# Patient Record
Sex: FEMALE | Race: WHITE
[De-identification: names, ages, dates, MRNs, and addresses within clinical notes are randomized per-mention and may not be internally consistent; named-entity substitution may affect disease eponyms.]

---

## 2019-11-21 ENCOUNTER — HOSPITAL ENCOUNTER (OUTPATIENT)
Dept: HOSPITAL 56 - MW.ED | Age: 79
Setting detail: OBSERVATION
LOS: 1 days | Discharge: HOME | End: 2019-11-22
Attending: INTERNAL MEDICINE | Admitting: INTERNAL MEDICINE
Payer: MEDICARE

## 2019-11-21 DIAGNOSIS — E03.9: ICD-10-CM

## 2019-11-21 DIAGNOSIS — Z88.2: ICD-10-CM

## 2019-11-21 DIAGNOSIS — I50.9: ICD-10-CM

## 2019-11-21 DIAGNOSIS — I48.91: ICD-10-CM

## 2019-11-21 DIAGNOSIS — Z79.01: ICD-10-CM

## 2019-11-21 DIAGNOSIS — I11.0: ICD-10-CM

## 2019-11-21 DIAGNOSIS — Z88.1: ICD-10-CM

## 2019-11-21 DIAGNOSIS — J44.1: Primary | ICD-10-CM

## 2019-11-21 DIAGNOSIS — N39.0: ICD-10-CM

## 2019-11-21 LAB
BUN SERPL-MCNC: 12 MG/DL (ref 7–18)
CHLORIDE SERPL-SCNC: 111 MMOL/L (ref 98–107)
CO2 SERPL-SCNC: 26.1 MMOL/L (ref 21–32)
GLUCOSE SERPL-MCNC: 119 MG/DL (ref 74–106)
POTASSIUM SERPL-SCNC: 4.2 MMOL/L (ref 3.5–5.1)
SODIUM SERPL-SCNC: 146 MMOL/L (ref 136–145)

## 2019-11-21 PROCEDURE — 99285 EMERGENCY DEPT VISIT HI MDM: CPT

## 2019-11-21 PROCEDURE — 85025 COMPLETE CBC W/AUTO DIFF WBC: CPT

## 2019-11-21 PROCEDURE — 93005 ELECTROCARDIOGRAM TRACING: CPT

## 2019-11-21 PROCEDURE — 71045 X-RAY EXAM CHEST 1 VIEW: CPT

## 2019-11-21 PROCEDURE — 85610 PROTHROMBIN TIME: CPT

## 2019-11-21 PROCEDURE — G0378 HOSPITAL OBSERVATION PER HR: HCPCS

## 2019-11-21 PROCEDURE — 87804 INFLUENZA ASSAY W/OPTIC: CPT

## 2019-11-21 PROCEDURE — 82962 GLUCOSE BLOOD TEST: CPT

## 2019-11-21 PROCEDURE — 80048 BASIC METABOLIC PNL TOTAL CA: CPT

## 2019-11-21 PROCEDURE — 94640 AIRWAY INHALATION TREATMENT: CPT

## 2019-11-21 PROCEDURE — 81001 URINALYSIS AUTO W/SCOPE: CPT

## 2019-11-21 PROCEDURE — 80053 COMPREHEN METABOLIC PANEL: CPT

## 2019-11-21 PROCEDURE — 84484 ASSAY OF TROPONIN QUANT: CPT

## 2019-11-21 PROCEDURE — 36415 COLL VENOUS BLD VENIPUNCTURE: CPT

## 2019-11-21 NOTE — PCM.HP.2
H&P History of Present Illness





- General


Date of Service: 11/21/19


Admit Problem/Dx: 


 Admission Diagnosis/Problem





Admission Diagnosis/Problem      COPD, Mild chronic obstructive pulmonary 

disease











- History of Present Illness


Initial Comments - Free Text/Narative: 





78 yo female with pmh of COPD, KAVITHA, atrial fibrillation, s/p ablation on 

Eliquis who presents with one day history of exertional dyspnea, cough, and 

wheezing.  Patient denies any fevers, chest pain, or chills.


  ** legs


Pain Score (Numeric/FACES): 10





- Related Data


Allergies/Adverse Reactions: 


 Allergies











Allergy/AdvReac Type Severity Reaction Status Date / Time


 


cephalexin [From Keflex] Allergy  Hives Verified 11/21/19 20:25


 


Sulfa (Sulfonamide Allergy  Hives Verified 11/21/19 20:25





Antibiotics)     











Home Medications: 


 Home Meds





Acyclovir 400 mg PO DAILY 11/21/19 [History]


Allopurinol [Zyloprim] 100 mg PO DAILY 11/21/19 [History]


Apixaban [Eliquis] 5 mg PO BID 11/21/19 [History]


Cholecalciferol (Vitamin D3) [Vitamin D] 50,000 unit PO WEEKLY 11/21/19 [History

]


Furosemide [Lasix] 80 mg PO BID 11/21/19 [History]


Gabapentin [Neurontin] 100 mg PO DAILY 11/21/19 [History]


Gabapentin [Neurontin] 300 mg PO BEDTIME 11/21/19 [History]


Levothyroxine 125 mcg PO ACBREAKFAST 11/21/19 [History]


Liraglutide [Victoza] 1.8 mg SUBCUT DAILY 11/21/19 [History]


Meloxicam [Mobic] 7.5 mg PO BID 11/21/19 [History]


Montelukast [Singulair] 10 mg PO DAILY 11/21/19 [History]


Nystatin [Nystop] 60 gm TP BID 11/21/19 [History]


Potassium Chloride 32 meq PO BID 11/21/19 [History]


Simvastatin 20 mg PO BEDTIME 11/21/19 [History]


amLODIPine [Norvasc] 5 mg PO DAILY 11/21/19 [History]


metFORMIN [Glucophage XR] 500 mg PO BIDMEALS 11/21/19 [History]


Albuterol [Ventolin HFA] 1 puff INH Q4H PRN #1 puff 11/22/19 [Rx]


Fluticasone/Salmeterol [Advair 250-50 Diskus] 1 each IH BID #1 disk.w.dev 11/22/ 19 [Rx]


Levofloxacin [Levaquin] 500 mg PO DAILY #5 tablet 11/22/19 [Rx]


predniSONE [Prednisone] 50 mg PO DAILY #5 tablet 11/22/19 [Rx]











Past Medical History


Cardiovascular History: Reports: Heart Failure, Hypertension


Respiratory History: Reports: COPD


Musculoskeletal History: Reports: Back Pain, Chronic


Endocrine/Metabolic History: Reports: Hypothyroidism





- Infectious Disease History


Infectious Disease History: Reports: Chicken Pox





Social & Family History





- Family History


Family Medical History: Noncontributory





- Tobacco Use


Smoking Status *Q: Never Smoker





H&P Review of Systems





- Review of Systems:


Review Of Systems: Comprehensive ROS is negative, except as noted in HPI.





Exam





- Exam


Exam: See Below





- Vital Signs


Vital Signs: 


 Last Vital Signs











Temp  36.4 C   11/21/19 20:00


 


Pulse  85   11/21/19 20:00


 


Resp  19   11/21/19 20:00


 


BP  129/59 L  11/21/19 20:00


 


Pulse Ox  97   11/21/19 20:00











Weight: 106.141 kg





- Exam


General: Alert, Oriented


Lungs: Clear to Auscultation, Normal Respiratory Effort


Cardiovascular: Regular Rate, Regular Rhythm


GI/Abdominal Exam: Normal Bowel Sounds, Soft, Non-Tender


Extremities: Non-Tender, No Pedal Edema


Skin: Warm, Dry, Intact





- Patient Data


Lab Results Last 24 hrs: 


 Laboratory Results - last 24 hr











  11/21/19 11/21/19 11/21/19 Range/Units





  16:49 16:49 16:49 


 


WBC  10.19    (4.0-11.0)  K/uL


 


RBC  4.57    (4.30-5.90)  M/uL


 


Hgb  12.7    (12.0-16.0)  g/dL


 


Hct  41.4    (36.0-46.0)  %


 


MCV  90.6    (80.0-98.0)  fL


 


MCH  27.8    (27.0-32.0)  pg


 


MCHC  30.7 L    (31.0-37.0)  g/dL


 


RDW Std Deviation  49.0    (28.0-62.0)  fl


 


RDW Coeff of Joseph  15    (11.0-15.0)  %


 


Plt Count  331    (150-400)  K/uL


 


MPV  9.80    (7.40-12.00)  fL


 


Neut % (Auto)  56.5    (48.0-80.0)  %


 


Lymph % (Auto)  32.2    (16.0-40.0)  %


 


Mono % (Auto)  8.8    (0.0-15.0)  %


 


Eos % (Auto)  2.2    (0.0-7.0)  %


 


Baso % (Auto)  0.3    (0.0-1.5)  %


 


Neut # (Auto)  5.8 H    (1.4-5.7)  K/uL


 


Lymph # (Auto)  3.3 H    (0.6-2.4)  K/uL


 


Mono # (Auto)  0.9 H    (0.0-0.8)  K/uL


 


Eos # (Auto)  0.2    (0.0-0.7)  K/uL


 


Baso # (Auto)  0.0    (0.0-0.1)  K/uL


 


Nucleated RBC %  0.0    /100WBC


 


Nucleated RBCs #  0    K/uL


 


INR   0.97   


 


Sodium    146 H  (136-145)  mmol/L


 


Potassium    4.2  (3.5-5.1)  mmol/L


 


Chloride    111 H  ()  mmol/L


 


Carbon Dioxide    26.1  (21.0-32.0)  mmol/L


 


BUN    12  (7.0-18.0)  mg/dL


 


Creatinine    0.9  (0.6-1.0)  mg/dL


 


Est Cr Clr Drug Dosing    50.90  mL/min


 


Estimated GFR (MDRD)    > 60.0  ml/min


 


Glucose    119 H  ()  mg/dL


 


POC Glucose     ()  mg/dL


 


Calcium    8.9  (8.5-10.1)  mg/dL


 


Total Bilirubin    0.3  (0.2-1.0)  mg/dL


 


AST    33  (15-37)  IU/L


 


ALT    32  (14-63)  IU/L


 


Alkaline Phosphatase    76  ()  U/L


 


Troponin I    < 0.050  (0.000-0.056)  ng/mL


 


Total Protein    6.4  (6.4-8.2)  g/dL


 


Albumin    3.0 L  (3.4-5.0)  g/dL


 


Globulin    3.4  (2.6-4.0)  g/dL


 


Albumin/Globulin Ratio    0.9  (0.9-1.6)  


 


Urine Color     


 


Urine Appearance     


 


Urine pH     (5.0-8.0)  


 


Ur Specific Gravity     (1.001-1.035)  


 


Urine Protein     (NEGATIVE)  mg/dL


 


Urine Glucose (UA)     (NEGATIVE)  mg/dL


 


Urine Ketones     (NEGATIVE)  mg/dL


 


Urine Occult Blood     (NEGATIVE)  


 


Urine Nitrite     (NEGATIVE)  


 


Urine Bilirubin     (NEGATIVE)  


 


Urine Urobilinogen     (<2.0)  EU/dL


 


Ur Leukocyte Esterase     (NEGATIVE)  


 


Urine RBC     (0-2/HPF)  


 


Urine WBC     (0-5/HPF)  


 


Ur Epithelial Cells     (NONE-FEW)  


 


Urine Bacteria     (NEGATIVE)  


 


Urine Mucus     (NONE-MOD)  














  11/21/19 11/21/19 Range/Units





  18:40 21:33 


 


WBC    (4.0-11.0)  K/uL


 


RBC    (4.30-5.90)  M/uL


 


Hgb    (12.0-16.0)  g/dL


 


Hct    (36.0-46.0)  %


 


MCV    (80.0-98.0)  fL


 


MCH    (27.0-32.0)  pg


 


MCHC    (31.0-37.0)  g/dL


 


RDW Std Deviation    (28.0-62.0)  fl


 


RDW Coeff of Joseph    (11.0-15.0)  %


 


Plt Count    (150-400)  K/uL


 


MPV    (7.40-12.00)  fL


 


Neut % (Auto)    (48.0-80.0)  %


 


Lymph % (Auto)    (16.0-40.0)  %


 


Mono % (Auto)    (0.0-15.0)  %


 


Eos % (Auto)    (0.0-7.0)  %


 


Baso % (Auto)    (0.0-1.5)  %


 


Neut # (Auto)    (1.4-5.7)  K/uL


 


Lymph # (Auto)    (0.6-2.4)  K/uL


 


Mono # (Auto)    (0.0-0.8)  K/uL


 


Eos # (Auto)    (0.0-0.7)  K/uL


 


Baso # (Auto)    (0.0-0.1)  K/uL


 


Nucleated RBC %    /100WBC


 


Nucleated RBCs #    K/uL


 


INR    


 


Sodium    (136-145)  mmol/L


 


Potassium    (3.5-5.1)  mmol/L


 


Chloride    ()  mmol/L


 


Carbon Dioxide    (21.0-32.0)  mmol/L


 


BUN    (7.0-18.0)  mg/dL


 


Creatinine    (0.6-1.0)  mg/dL


 


Est Cr Clr Drug Dosing    mL/min


 


Estimated GFR (MDRD)    ml/min


 


Glucose    ()  mg/dL


 


POC Glucose   195 H  ()  mg/dL


 


Calcium    (8.5-10.1)  mg/dL


 


Total Bilirubin    (0.2-1.0)  mg/dL


 


AST    (15-37)  IU/L


 


ALT    (14-63)  IU/L


 


Alkaline Phosphatase    ()  U/L


 


Troponin I    (0.000-0.056)  ng/mL


 


Total Protein    (6.4-8.2)  g/dL


 


Albumin    (3.4-5.0)  g/dL


 


Globulin    (2.6-4.0)  g/dL


 


Albumin/Globulin Ratio    (0.9-1.6)  


 


Urine Color  YELLOW   


 


Urine Appearance  CLOUDY   


 


Urine pH  6.0   (5.0-8.0)  


 


Ur Specific Gravity  1.015   (1.001-1.035)  


 


Urine Protein  NEGATIVE   (NEGATIVE)  mg/dL


 


Urine Glucose (UA)  NEGATIVE   (NEGATIVE)  mg/dL


 


Urine Ketones  NEGATIVE   (NEGATIVE)  mg/dL


 


Urine Occult Blood  TRACE-INTACT H   (NEGATIVE)  


 


Urine Nitrite  POSITIVE H   (NEGATIVE)  


 


Urine Bilirubin  NEGATIVE   (NEGATIVE)  


 


Urine Urobilinogen  0.2   (<2.0)  EU/dL


 


Ur Leukocyte Esterase  SMALL H   (NEGATIVE)  


 


Urine RBC  1-3   (0-2/HPF)  


 


Urine WBC  30-35   (0-5/HPF)  


 


Ur Epithelial Cells  FEW   (NONE-FEW)  


 


Urine Bacteria  4+ H   (NEGATIVE)  


 


Urine Mucus  LIGHT   (NONE-MOD)  











Result Diagrams: 


 11/22/19 06:03





 11/22/19 06:03


Mirza Results Last 24 hrs: 


 Microbiology











 11/21/19 17:52 Influenza Type A Antigen Screen - Final





 Nasopharyngeal Swab    NEGATIVE INFLUENZA A VIRUS AG





    REFERENCE RANGE: NEGATIVE





 Influenza Type B Antigen Screen - Final





    NEGATIVE INFLUENZA B VIRUS AG





    REFERENCE RANGE: NEGATIVE











Problem List Initiated/Reviewed/Updated: Yes


Orders Last 24hrs: 


 Active Orders 24 hr











 Category Date Time Status


 


 Patient Status [ADT] Stat ADT  11/21/19 18:42 Active


 


 Antiembolic Devices [RC] PER UNIT ROUTINE Care  11/21/19 22:26 Ordered


 


 Blood Glucose Check, Bedside [RC] TIDAC Care  11/21/19 21:27 Active


 


 Cardiac Monitoring [RC] .AS DIRECTED Care  11/21/19 16:31 Active


 


 EKG Documentation Completion [RC] STAT Care  11/21/19 16:31 Active


 


 Oxygen Therapy [RC] ASDIRECTED Care  11/21/19 16:31 Active


 


 Oxygen Therapy [RC] PRN Care  11/21/19 22:25 Ordered


 


 Pulse Oximetry [RC] ASDIRECTED Care  11/21/19 16:31 Active


 


 RT Aerosol Therapy [RC] ASDIRECTED Care  11/21/19 21:29 Active


 


 RT Aerosol Therapy [RC] ASDIRECTED Care  11/21/19 22:26 Ordered


 


 Up ad Smita [RC] ASDIRECTED Care  11/21/19 22:25 Ordered


 


 VTE/DVT Education [RC] PER UNIT ROUTINE Care  11/21/19 22:25 Ordered


 


 Vital Signs [RC] Q4H Care  11/21/19 22:25 Ordered


 


 ADA Diabetic [American Diabetic Association Diet] [DIET Diet  11/22/19 

Breakfast Active





 ]   


 


 BASIC METABOLIC PANEL,BMP [CHEM] AM Lab  11/22/19 05:11 Ordered


 


 CBC WITH AUTO DIFF [HEME] AM Lab  11/22/19 05:11 Ordered


 


 Albuterol/Ipratropium [DuoNeb 3.0-0.5 MG/3 ML] Med  11/21/19 22:25 Ordered





 3 ml NEB Q2H PRN   


 


 Albuterol/Ipratropium [DuoNeb 3.0-0.5 MG/3 ML] Med  11/22/19 00:00 Active





 3 ml NEB Q6HRRT   


 


 Apixaban [Eliquis] Med  11/22/19 09:00 Ordered





 5 mg PO BID   


 


 Furosemide [Lasix] Med  11/22/19 09:00 Ordered





 80 mg PO BID   


 


 Gabapentin [Neurontin] Med  11/22/19 09:00 Ordered





 100 mg PO DAILY   


 


 Gabapentin [Neurontin] Med  11/21/19 21:00 Ordered





 300 mg PO BEDTIME   


 


 Insulin Aspart [NovoLOG] Med  11/21/19 22:00 Active





 See Protocol  SUBCUT QID   


 


 Liraglutide Med  11/22/19 09:00 Ordered





 1.8 mg SUBCUT DAILY   


 


 Montelukast [Singulair] Med  11/22/19 09:00 Ordered





 10 mg PO DAILY   


 


 Pantoprazole [ProTONIX***] Med  11/22/19 09:00 Ordered





 40 mg PO DAILY   


 


 Simvastatin [Zocor] Med  11/22/19 21:00 Ordered





 20 mg PO BEDTIME   


 


 amLODIPine [Norvasc] Med  11/22/19 09:00 Ordered





 5 mg PO DAILY   


 


 methylPREDNISolone Sod Succ [Solu-MEDROL] Med  11/22/19 09:00 Ordered





 125 mg IVPUSH DAILY   


 


 Sequential Compression Device [OM.PC] Per Unit Routine Oth  11/21/19 22:25 

Ordered


 


 Resuscitation Status Routine Resus Stat  11/21/19 22:25 Ordered








 Medication Orders





Albuterol/Ipratropium (Duoneb 3.0-0.5 Mg/3 Ml)  3 ml NEB Q6HRRT [facility]KA


Albuterol/Ipratropium (Duoneb 3.0-0.5 Mg/3 Ml)  3 ml NEB Q2H PRN


   PRN Reason: Shortness Of Breath/wheezing


Amlodipine Besylate (Norvasc)  5 mg PO DAILY ZULEYKA


Apixaban (Eliquis)  5 mg PO BID [facility]KA


Furosemide (Lasix)  80 mg PO BID [facility]ULEYKA


Gabapentin (Neurontin)  100 mg PO DAILY [facility]


Gabapentin (Neurontin)  300 mg PO BEDTIME [facility]


Insulin Aspart (Novolog)  0 unit SUBCUT QID ZULEYKA; Protocol


   Last Admin: 11/21/19 22:04  Dose: 1 unit


Methylprednisolone Sodium Succinate (Solu-Medrol)  125 mg IVPUSH DAILY [facility]


Montelukast Sodium (Singulair)  10 mg PO DAILY [facility]


Non-Formulary Medication (Liraglutide)  1.8 mg SUBCUT DAILY [facility]


Pantoprazole Sodium (Protonix***)  40 mg PO DAILY [facility]


Simvastatin (Zocor)  20 mg PO BEDTIME [facility]








Assessment/Plan Comment:: 





78 yo female admitted for COPD exacerbation.

## 2019-11-21 NOTE — CR
Indication:



SOB



Technique:



A single AP view of the chest was obtained.



Comparison:



None



Findings:



The heart is normal in size. The lungs are clear. No infiltrate, pleural 

effusion, or pneumothorax is identified.



Impression:



No acute cardiopulmonary process.



Dictated by Kerrie Perez MD @ Nov 21 2019  6:04PM



Signed by Dr. Kerrie Perez @ Nov 21 2019  6:06PM

## 2019-11-21 NOTE — EDM.PDOC
ED HPI GENERAL MEDICAL PROBLEM





- General


Chief Complaint: Respiratory Problem


Stated Complaint: TROUBLE BREATHING


Time Seen by Provider: 11/21/19 16:22





- History of Present Illness


INITIAL COMMENTS - FREE TEXT/NARRATIVE: 


HISTORY AND PHYSICAL:





History of present illness:


Patient is 79-year-old white female history COPD who presents with a concern of 

shortness of breath patient recently moved to the area and brought her 

medications but does not have a nebulizer she denies chest pain nausea vomiting 

fever chills or other complaints.





Review of systems: 


As per history of present illness and below otherwise all systems reviewed and 

negative.





Past medical history: 


As per history of present illness and as reviewed below otherwise 

noncontributory.





Surgical history: 


As per history of present illness and as reviewed below otherwise 

noncontributory.





Social history: 


No reported history of drug or alcohol abuse.





Family history: 


As per history of present illness and as reviewed below otherwise 

noncontributory.





Physical exam:


HEENT: Atraumatic, normocephalic, pupils reactive, negative for conjunctival 

pallor or scleral icterus, mucous membranes moist, throat clear, neck supple, 

nontender, trachea midline.


Lungs: Diminished slightly coarse bilaterally, breath sounds equal bilaterally, 

chest nontender.


Heart: S1S2, regular, negative for clicks, rubs, or JVD.


Abdomen: Soft, nondistended, nontender. Negative for masses or 

hepatosplenomegaly. Negative for costovertebral tenderness.


Pelvis: Stable nontender.


Genitourinary: Deferred.


Rectal: Deferred.


Extremities: Atraumatic, negative for cords or calf pain. Neurovascular 

unremarkable.


Neuro: Awake, alert, oriented. Cranial nerves II through XII unremarkable. 

Cerebellum unremarkable. Motor and sensory unremarkable throughout. Exam 

nonfocal.





Diagnostics:


CBC CMP troponin PT/INR chest x-ray EKG





Therapeutics:


Albuterol ipratropium nebulizer IV O2 monitor I Medrol 125 mg IV





Impression: 


#1 COPD with exacerbation #2 medical noncompliance





Definitive disposition and diagnosis as appropriate pending reevaluation and 

review of above.








- Related Data


 Allergies











Allergy/AdvReac Type Severity Reaction Status Date / Time


 


cephalexin [From Keflex] Allergy  Hives Verified 11/21/19 16:30


 


Sulfa (Sulfonamide Allergy  Hives Verified 11/21/19 16:30





Antibiotics)     














ED ROS GENERAL





- Review of Systems


Review Of Systems: Comprehensive ROS is negative, except as noted in HPI.





ED EXAM, GENERAL





- Physical Exam


Exam: See Below (dictation)





Course





- Vital Signs


Last Recorded V/S: 


 Last Vital Signs











Temp  36.5 C   11/21/19 16:26


 


Pulse  79   11/21/19 18:30


 


Resp  21 H  11/21/19 18:30


 


BP  128/58 L  11/21/19 18:30


 


Pulse Ox  99   11/21/19 18:30














- Orders/Labs/Meds


Orders: 


 Active Orders 24 hr











 Category Date Time Status


 


 Cardiac Monitoring [RC] .AS DIRECTED Care  11/21/19 16:31 Active


 


 EKG Documentation Completion [RC] STAT Care  11/21/19 16:31 Active


 


 Oxygen Therapy [RC] ASDIRECTED Care  11/21/19 16:31 Active


 


 Pulse Oximetry [RC] ASDIRECTED Care  11/21/19 16:31 Active


 


 UA W/MICROSCOPIC [URIN] Stat Lab  11/21/19 16:31 Ordered











Labs: 


 Laboratory Tests











  11/21/19 11/21/19 11/21/19 Range/Units





  16:49 16:49 16:49 


 


WBC  10.19    (4.0-11.0)  K/uL


 


RBC  4.57    (4.30-5.90)  M/uL


 


Hgb  12.7    (12.0-16.0)  g/dL


 


Hct  41.4    (36.0-46.0)  %


 


MCV  90.6    (80.0-98.0)  fL


 


MCH  27.8    (27.0-32.0)  pg


 


MCHC  30.7 L    (31.0-37.0)  g/dL


 


RDW Std Deviation  49.0    (28.0-62.0)  fl


 


RDW Coeff of Joseph  15    (11.0-15.0)  %


 


Plt Count  331    (150-400)  K/uL


 


MPV  9.80    (7.40-12.00)  fL


 


Neut % (Auto)  56.5    (48.0-80.0)  %


 


Lymph % (Auto)  32.2    (16.0-40.0)  %


 


Mono % (Auto)  8.8    (0.0-15.0)  %


 


Eos % (Auto)  2.2    (0.0-7.0)  %


 


Baso % (Auto)  0.3    (0.0-1.5)  %


 


Neut # (Auto)  5.8 H    (1.4-5.7)  K/uL


 


Lymph # (Auto)  3.3 H    (0.6-2.4)  K/uL


 


Mono # (Auto)  0.9 H    (0.0-0.8)  K/uL


 


Eos # (Auto)  0.2    (0.0-0.7)  K/uL


 


Baso # (Auto)  0.0    (0.0-0.1)  K/uL


 


Nucleated RBC %  0.0    /100WBC


 


Nucleated RBCs #  0    K/uL


 


INR   0.97   


 


Sodium    146 H  (136-145)  mmol/L


 


Potassium    4.2  (3.5-5.1)  mmol/L


 


Chloride    111 H  ()  mmol/L


 


Carbon Dioxide    26.1  (21.0-32.0)  mmol/L


 


BUN    12  (7.0-18.0)  mg/dL


 


Creatinine    0.9  (0.6-1.0)  mg/dL


 


Est Cr Clr Drug Dosing    50.90  mL/min


 


Estimated GFR (MDRD)    > 60.0  ml/min


 


Glucose    119 H  ()  mg/dL


 


Calcium    8.9  (8.5-10.1)  mg/dL


 


Total Bilirubin    0.3  (0.2-1.0)  mg/dL


 


AST    33  (15-37)  IU/L


 


ALT    32  (14-63)  IU/L


 


Alkaline Phosphatase    76  ()  U/L


 


Troponin I    < 0.050  (0.000-0.056)  ng/mL


 


Total Protein    6.4  (6.4-8.2)  g/dL


 


Albumin    3.0 L  (3.4-5.0)  g/dL


 


Globulin    3.4  (2.6-4.0)  g/dL


 


Albumin/Globulin Ratio    0.9  (0.9-1.6)  











Meds: 


Medications














Discontinued Medications














Generic Name Dose Route Start Last Admin





  Trade Name Freq  PRN Reason Stop Dose Admin


 


Albuterol/Ipratropium  Confirm  11/21/19 16:25  11/21/19 17:07





  Duoneb 3.0-0.5 Mg/3 Ml  Administered  11/21/19 16:26  3 ml





  Dose   Administration





  3 ml   





  .ROUTE   





  .STK-MED ONE   





     





     





     





     


 


Aspirin  324 mg  11/21/19 16:31  11/21/19 17:07





  Aspirin  PO  11/21/19 16:32  324 mg





  ONETIME ONE   Administration





     





     





     





     


 


Methylprednisolone Sodium Succinate  125 mg  11/21/19 16:32  11/21/19 17:09





  Solu-Medrol  IVPUSH  11/21/19 16:33  125 mg





  ONETIME ONE   Administration





     





     





     





     














Departure





- Departure


Time of Disposition: 18:40


Disposition: Home, Self-Care 01


Condition: Good


Clinical Impression: 


 COPD (chronic obstructive pulmonary disease)








- Discharge Information


Referrals: 


Birgit Holden DO [Primary Care Provider] - 


Forms:  ED Department Discharge





- My Orders


Last 24 Hours: 


My Active Orders





11/21/19 16:31


Cardiac Monitoring [RC] .AS DIRECTED 


EKG Documentation Completion [RC] STAT 


Oxygen Therapy [RC] ASDIRECTED 


Pulse Oximetry [RC] ASDIRECTED 


UA W/MICROSCOPIC [URIN] Stat 














- Assessment/Plan


Last 24 Hours: 


My Active Orders





11/21/19 16:31


Cardiac Monitoring [RC] .AS DIRECTED 


EKG Documentation Completion [RC] STAT 


Oxygen Therapy [RC] ASDIRECTED 


Pulse Oximetry [RC] ASDIRECTED 


UA W/MICROSCOPIC [URIN] Stat

## 2019-11-22 LAB
BUN SERPL-MCNC: 13 MG/DL (ref 7–18)
CHLORIDE SERPL-SCNC: 108 MMOL/L (ref 98–107)
CO2 SERPL-SCNC: 24.1 MMOL/L (ref 21–32)
GLUCOSE SERPL-MCNC: 170 MG/DL (ref 74–106)
POTASSIUM SERPL-SCNC: 4.5 MMOL/L (ref 3.5–5.1)
SODIUM SERPL-SCNC: 143 MMOL/L (ref 136–145)

## 2019-11-22 NOTE — [id number].[id number]
**Discharge Summary





- Discharge Data


Discharge Date: 11/22/19


Discharge Disposition: Home, Self-Care 01


Condition: Good





- Referral to Home Health


Primary Care Physician: 


Birgit Holden DO








- Patient Summary/Data


Hospital Course: 


80 yo female with pmh of COPD, KAVITHA, atrial fibrillation, s/p ablation on 

Eliquis who presented with one day history of exertional dyspnea, cough, and 

wheezing.    Patient was satting mid 90s on room air.  She had wheezing on 

presentation but had no acute respiratory distress.  Chest x-ray reported no 

acute pulmonary disease.  She was admitted for COPD exacerbation and treated 

with solumedrol and duonebs.  The following morning patient was requesting 

discharge home.  She was discharge home with five more days of prednisone, 

albuterol inhaler and Advair.  Patient was also sent home with Levaquin for 

treatment of a UTI.








- Discharge Plan


Prescriptions/Med Rec: 


Albuterol [Ventolin HFA] 1 puff INH Q4H PRN #1 puff


 PRN Reason: Wheezing


Fluticasone/Salmeterol [Advair 250-50 Diskus] 1 each IH BID #1 disk.w.dev


Levofloxacin [Levaquin] 500 mg PO DAILY #5 tablet


predniSONE [Prednisone] 50 mg PO DAILY #5 tablet


Home Medications: 


 Home Meds





Acyclovir 400 mg PO DAILY 11/21/19 [History]


Allopurinol [Zyloprim] 100 mg PO DAILY 11/21/19 [History]


Apixaban [Eliquis] 5 mg PO BID 11/21/19 [History]


Cholecalciferol (Vitamin D3) [Vitamin D] 50,000 unit PO WEEKLY 11/21/19 [History

]


Furosemide [Lasix] 80 mg PO BID 11/21/19 [History]


Gabapentin [Neurontin] 100 mg PO DAILY 11/21/19 [History]


Gabapentin [Neurontin] 300 mg PO BEDTIME 11/21/19 [History]


Levothyroxine 125 mcg PO ACBREAKFAST 11/21/19 [History]


Liraglutide [Victoza] 1.8 mg SUBCUT DAILY 11/21/19 [History]


Meloxicam [Mobic] 7.5 mg PO BID 11/21/19 [History]


Montelukast [Singulair] 10 mg PO DAILY 11/21/19 [History]


Nystatin [Nystop] 60 gm TP BID 11/21/19 [History]


Potassium Chloride 32 meq PO BID 11/21/19 [History]


Simvastatin 20 mg PO BEDTIME 11/21/19 [History]


amLODIPine [Norvasc] 5 mg PO DAILY 11/21/19 [History]


metFORMIN [Glucophage XR] 500 mg PO BIDMEALS 11/21/19 [History]


Albuterol [Ventolin HFA] 1 puff INH Q4H PRN #1 puff 11/22/19 [Rx]


Fluticasone/Salmeterol [Advair 250-50 Diskus] 1 each IH BID #1 disk.w.dev 11/22/ 19 [Rx]


Levofloxacin [Levaquin] 500 mg PO DAILY #5 tablet 11/22/19 [Rx]


predniSONE [Prednisone] 50 mg PO DAILY #5 tablet 11/22/19 [Rx]








Forms:  ED Department Discharge


Referrals: 


Birgit Holden DO [Primary Care Provider] - 





- Discharge Summary/Plan Comment


DC Time >30 min.: No





- Patient Data


Vitals - Most Recent: 


 Last Vital Signs











Temp  36.0 C   11/22/19 04:00


 


Pulse  81   11/22/19 04:00


 


Resp  16   11/22/19 04:00


 


BP  133/63   11/22/19 09:37


 


Pulse Ox  96   11/22/19 06:01











Weight - Most Recent: 106.141 kg


I&O - Last 24 hours: 


 Intake & Output











 11/21/19 11/22/19 11/22/19





 22:59 06:59 14:59


 


Intake Total  150 


 


Output Total  500 


 


Balance  -350 











Lab Results - Last 24 hrs: 


 Laboratory Results - last 24 hr











  11/21/19 11/21/19 11/21/19 Range/Units





  16:49 16:49 16:49 


 


WBC  10.19    (4.0-11.0)  K/uL


 


RBC  4.57    (4.30-5.90)  M/uL


 


Hgb  12.7    (12.0-16.0)  g/dL


 


Hct  41.4    (36.0-46.0)  %


 


MCV  90.6    (80.0-98.0)  fL


 


MCH  27.8    (27.0-32.0)  pg


 


MCHC  30.7 L    (31.0-37.0)  g/dL


 


RDW Std Deviation  49.0    (28.0-62.0)  fl


 


RDW Coeff of Joseph  15    (11.0-15.0)  %


 


Plt Count  331    (150-400)  K/uL


 


MPV  9.80    (7.40-12.00)  fL


 


Neut % (Auto)  56.5    (48.0-80.0)  %


 


Lymph % (Auto)  32.2    (16.0-40.0)  %


 


Mono % (Auto)  8.8    (0.0-15.0)  %


 


Eos % (Auto)  2.2    (0.0-7.0)  %


 


Baso % (Auto)  0.3    (0.0-1.5)  %


 


Neut # (Auto)  5.8 H    (1.4-5.7)  K/uL


 


Lymph # (Auto)  3.3 H    (0.6-2.4)  K/uL


 


Mono # (Auto)  0.9 H    (0.0-0.8)  K/uL


 


Eos # (Auto)  0.2    (0.0-0.7)  K/uL


 


Baso # (Auto)  0.0    (0.0-0.1)  K/uL


 


Nucleated RBC %  0.0    /100WBC


 


Nucleated RBCs #  0    K/uL


 


INR   0.97   


 


Sodium    146 H  (136-145)  mmol/L


 


Potassium    4.2  (3.5-5.1)  mmol/L


 


Chloride    111 H  ()  mmol/L


 


Carbon Dioxide    26.1  (21.0-32.0)  mmol/L


 


BUN    12  (7.0-18.0)  mg/dL


 


Creatinine    0.9  (0.6-1.0)  mg/dL


 


Est Cr Clr Drug Dosing    50.90  mL/min


 


Estimated GFR (MDRD)    > 60.0  ml/min


 


Glucose    119 H  ()  mg/dL


 


POC Glucose     ()  mg/dL


 


Calcium    8.9  (8.5-10.1)  mg/dL


 


Total Bilirubin    0.3  (0.2-1.0)  mg/dL


 


AST    33  (15-37)  IU/L


 


ALT    32  (14-63)  IU/L


 


Alkaline Phosphatase    76  ()  U/L


 


Troponin I    < 0.050  (0.000-0.056)  ng/mL


 


Total Protein    6.4  (6.4-8.2)  g/dL


 


Albumin    3.0 L  (3.4-5.0)  g/dL


 


Globulin    3.4  (2.6-4.0)  g/dL


 


Albumin/Globulin Ratio    0.9  (0.9-1.6)  


 


Urine Color     


 


Urine Appearance     


 


Urine pH     (5.0-8.0)  


 


Ur Specific Gravity     (1.001-1.035)  


 


Urine Protein     (NEGATIVE)  mg/dL


 


Urine Glucose (UA)     (NEGATIVE)  mg/dL


 


Urine Ketones     (NEGATIVE)  mg/dL


 


Urine Occult Blood     (NEGATIVE)  


 


Urine Nitrite     (NEGATIVE)  


 


Urine Bilirubin     (NEGATIVE)  


 


Urine Urobilinogen     (<2.0)  EU/dL


 


Ur Leukocyte Esterase     (NEGATIVE)  


 


Urine RBC     (0-2/HPF)  


 


Urine WBC     (0-5/HPF)  


 


Ur Epithelial Cells     (NONE-FEW)  


 


Urine Bacteria     (NEGATIVE)  


 


Urine Mucus     (NONE-MOD)  














  11/21/19 11/21/19 11/22/19 Range/Units





  18:40 21:33 00:28 


 


WBC     (4.0-11.0)  K/uL


 


RBC     (4.30-5.90)  M/uL


 


Hgb     (12.0-16.0)  g/dL


 


Hct     (36.0-46.0)  %


 


MCV     (80.0-98.0)  fL


 


MCH     (27.0-32.0)  pg


 


MCHC     (31.0-37.0)  g/dL


 


RDW Std Deviation     (28.0-62.0)  fl


 


RDW Coeff of Joseph     (11.0-15.0)  %


 


Plt Count     (150-400)  K/uL


 


MPV     (7.40-12.00)  fL


 


Neut % (Auto)     (48.0-80.0)  %


 


Lymph % (Auto)     (16.0-40.0)  %


 


Mono % (Auto)     (0.0-15.0)  %


 


Eos % (Auto)     (0.0-7.0)  %


 


Baso % (Auto)     (0.0-1.5)  %


 


Neut # (Auto)     (1.4-5.7)  K/uL


 


Lymph # (Auto)     (0.6-2.4)  K/uL


 


Mono # (Auto)     (0.0-0.8)  K/uL


 


Eos # (Auto)     (0.0-0.7)  K/uL


 


Baso # (Auto)     (0.0-0.1)  K/uL


 


Nucleated RBC %     /100WBC


 


Nucleated RBCs #     K/uL


 


INR     


 


Sodium     (136-145)  mmol/L


 


Potassium     (3.5-5.1)  mmol/L


 


Chloride     ()  mmol/L


 


Carbon Dioxide     (21.0-32.0)  mmol/L


 


BUN     (7.0-18.0)  mg/dL


 


Creatinine     (0.6-1.0)  mg/dL


 


Est Cr Clr Drug Dosing     mL/min


 


Estimated GFR (MDRD)     ml/min


 


Glucose     ()  mg/dL


 


POC Glucose   195 H  183 H  ()  mg/dL


 


Calcium     (8.5-10.1)  mg/dL


 


Total Bilirubin     (0.2-1.0)  mg/dL


 


AST     (15-37)  IU/L


 


ALT     (14-63)  IU/L


 


Alkaline Phosphatase     ()  U/L


 


Troponin I     (0.000-0.056)  ng/mL


 


Total Protein     (6.4-8.2)  g/dL


 


Albumin     (3.4-5.0)  g/dL


 


Globulin     (2.6-4.0)  g/dL


 


Albumin/Globulin Ratio     (0.9-1.6)  


 


Urine Color  YELLOW    


 


Urine Appearance  CLOUDY    


 


Urine pH  6.0    (5.0-8.0)  


 


Ur Specific Gravity  1.015    (1.001-1.035)  


 


Urine Protein  NEGATIVE    (NEGATIVE)  mg/dL


 


Urine Glucose (UA)  NEGATIVE    (NEGATIVE)  mg/dL


 


Urine Ketones  NEGATIVE    (NEGATIVE)  mg/dL


 


Urine Occult Blood  TRACE-INTACT H    (NEGATIVE)  


 


Urine Nitrite  POSITIVE H    (NEGATIVE)  


 


Urine Bilirubin  NEGATIVE    (NEGATIVE)  


 


Urine Urobilinogen  0.2    (<2.0)  EU/dL


 


Ur Leukocyte Esterase  SMALL H    (NEGATIVE)  


 


Urine RBC  1-3    (0-2/HPF)  


 


Urine WBC  30-35    (0-5/HPF)  


 


Ur Epithelial Cells  FEW    (NONE-FEW)  


 


Urine Bacteria  4+ H    (NEGATIVE)  


 


Urine Mucus  LIGHT    (NONE-MOD)  














  11/22/19 11/22/19 11/22/19 Range/Units





  06:03 06:03 06:09 


 


WBC  9.67    (4.0-11.0)  K/uL


 


RBC  4.81    (4.30-5.90)  M/uL


 


Hgb  13.4    (12.0-16.0)  g/dL


 


Hct  42.7    (36.0-46.0)  %


 


MCV  88.8    (80.0-98.0)  fL


 


MCH  27.9    (27.0-32.0)  pg


 


MCHC  31.4    (31.0-37.0)  g/dL


 


RDW Std Deviation  46.7    (28.0-62.0)  fl


 


RDW Coeff of Joseph  14    (11.0-15.0)  %


 


Plt Count  355    (150-400)  K/uL


 


MPV  10.00    (7.40-12.00)  fL


 


Neut % (Auto)  85.4 H    (48.0-80.0)  %


 


Lymph % (Auto)  13.9 L    (16.0-40.0)  %


 


Mono % (Auto)  0.6    (0.0-15.0)  %


 


Eos % (Auto)  0.0    (0.0-7.0)  %


 


Baso % (Auto)  0.1    (0.0-1.5)  %


 


Neut # (Auto)  8.3 H    (1.4-5.7)  K/uL


 


Lymph # (Auto)  1.3    (0.6-2.4)  K/uL


 


Mono # (Auto)  0.1    (0.0-0.8)  K/uL


 


Eos # (Auto)  0.0    (0.0-0.7)  K/uL


 


Baso # (Auto)  0.0    (0.0-0.1)  K/uL


 


Nucleated RBC %  0.0    /100WBC


 


Nucleated RBCs #  0    K/uL


 


INR     


 


Sodium   143   (136-145)  mmol/L


 


Potassium   4.5   (3.5-5.1)  mmol/L


 


Chloride   108 H   ()  mmol/L


 


Carbon Dioxide   24.1   (21.0-32.0)  mmol/L


 


BUN   13   (7.0-18.0)  mg/dL


 


Creatinine   1.0   (0.6-1.0)  mg/dL


 


Est Cr Clr Drug Dosing   45.82   mL/min


 


Estimated GFR (MDRD)   > 60.0   ml/min


 


Glucose   170 H   ()  mg/dL


 


POC Glucose    161 H  ()  mg/dL


 


Calcium   9.2   (8.5-10.1)  mg/dL


 


Total Bilirubin     (0.2-1.0)  mg/dL


 


AST     (15-37)  IU/L


 


ALT     (14-63)  IU/L


 


Alkaline Phosphatase     ()  U/L


 


Troponin I     (0.000-0.056)  ng/mL


 


Total Protein     (6.4-8.2)  g/dL


 


Albumin     (3.4-5.0)  g/dL


 


Globulin     (2.6-4.0)  g/dL


 


Albumin/Globulin Ratio     (0.9-1.6)  


 


Urine Color     


 


Urine Appearance     


 


Urine pH     (5.0-8.0)  


 


Ur Specific Gravity     (1.001-1.035)  


 


Urine Protein     (NEGATIVE)  mg/dL


 


Urine Glucose (UA)     (NEGATIVE)  mg/dL


 


Urine Ketones     (NEGATIVE)  mg/dL


 


Urine Occult Blood     (NEGATIVE)  


 


Urine Nitrite     (NEGATIVE)  


 


Urine Bilirubin     (NEGATIVE)  


 


Urine Urobilinogen     (<2.0)  EU/dL


 


Ur Leukocyte Esterase     (NEGATIVE)  


 


Urine RBC     (0-2/HPF)  


 


Urine WBC     (0-5/HPF)  


 


Ur Epithelial Cells     (NONE-FEW)  


 


Urine Bacteria     (NEGATIVE)  


 


Urine Mucus     (NONE-MOD)  














  11/22/19 Range/Units





  11:56 


 


WBC   (4.0-11.0)  K/uL


 


RBC   (4.30-5.90)  M/uL


 


Hgb   (12.0-16.0)  g/dL


 


Hct   (36.0-46.0)  %


 


MCV   (80.0-98.0)  fL


 


MCH   (27.0-32.0)  pg


 


MCHC   (31.0-37.0)  g/dL


 


RDW Std Deviation   (28.0-62.0)  fl


 


RDW Coeff of Joseph   (11.0-15.0)  %


 


Plt Count   (150-400)  K/uL


 


MPV   (7.40-12.00)  fL


 


Neut % (Auto)   (48.0-80.0)  %


 


Lymph % (Auto)   (16.0-40.0)  %


 


Mono % (Auto)   (0.0-15.0)  %


 


Eos % (Auto)   (0.0-7.0)  %


 


Baso % (Auto)   (0.0-1.5)  %


 


Neut # (Auto)   (1.4-5.7)  K/uL


 


Lymph # (Auto)   (0.6-2.4)  K/uL


 


Mono # (Auto)   (0.0-0.8)  K/uL


 


Eos # (Auto)   (0.0-0.7)  K/uL


 


Baso # (Auto)   (0.0-0.1)  K/uL


 


Nucleated RBC %   /100WBC


 


Nucleated RBCs #   K/uL


 


INR   


 


Sodium   (136-145)  mmol/L


 


Potassium   (3.5-5.1)  mmol/L


 


Chloride   ()  mmol/L


 


Carbon Dioxide   (21.0-32.0)  mmol/L


 


BUN   (7.0-18.0)  mg/dL


 


Creatinine   (0.6-1.0)  mg/dL


 


Est Cr Clr Drug Dosing   mL/min


 


Estimated GFR (MDRD)   ml/min


 


Glucose   ()  mg/dL


 


POC Glucose  153 H  ()  mg/dL


 


Calcium   (8.5-10.1)  mg/dL


 


Total Bilirubin   (0.2-1.0)  mg/dL


 


AST   (15-37)  IU/L


 


ALT   (14-63)  IU/L


 


Alkaline Phosphatase   ()  U/L


 


Troponin I   (0.000-0.056)  ng/mL


 


Total Protein   (6.4-8.2)  g/dL


 


Albumin   (3.4-5.0)  g/dL


 


Globulin   (2.6-4.0)  g/dL


 


Albumin/Globulin Ratio   (0.9-1.6)  


 


Urine Color   


 


Urine Appearance   


 


Urine pH   (5.0-8.0)  


 


Ur Specific Gravity   (1.001-1.035)  


 


Urine Protein   (NEGATIVE)  mg/dL


 


Urine Glucose (UA)   (NEGATIVE)  mg/dL


 


Urine Ketones   (NEGATIVE)  mg/dL


 


Urine Occult Blood   (NEGATIVE)  


 


Urine Nitrite   (NEGATIVE)  


 


Urine Bilirubin   (NEGATIVE)  


 


Urine Urobilinogen   (<2.0)  EU/dL


 


Ur Leukocyte Esterase   (NEGATIVE)  


 


Urine RBC   (0-2/HPF)  


 


Urine WBC   (0-5/HPF)  


 


Ur Epithelial Cells   (NONE-FEW)  


 


Urine Bacteria   (NEGATIVE)  


 


Urine Mucus   (NONE-MOD)  











LUCIA Results - Last 24 hrs: 


 Microbiology











 11/21/19 17:52 Influenza Type A Antigen Screen - Final





 Nasopharyngeal Swab    NEGATIVE INFLUENZA A VIRUS AG





    REFERENCE RANGE: NEGATIVE





 Influenza Type B Antigen Screen - Final





    NEGATIVE INFLUENZA B VIRUS AG





    REFERENCE RANGE: NEGATIVE











Med Orders - Current: 


 Current Medications





Albuterol/Ipratropium (Duoneb 3.0-0.5 Mg/3 Ml)  3 ml NEB Q6HRRT [facility]KA


   Last Admin: 11/22/19 11:18 Dose:  3 ml


Albuterol/Ipratropium (Duoneb 3.0-0.5 Mg/3 Ml)  3 ml NEB Q2H PRN


   PRN Reason: Shortness Of Breath/wheezing


Amlodipine Besylate (Norvasc)  5 mg PO DAILY [facility]


   Last Admin: 11/22/19 09:37 Dose:  5 mg


Apixaban (Eliquis)  5 mg PO BID [facility]


   Last Admin: 11/22/19 09:37 Dose:  5 mg


Furosemide (Lasix)  80 mg PO BIDDIURETIC [facility]


   Last Admin: 11/22/19 08:02 Dose:  80 mg


Gabapentin (Neurontin)  100 mg PO DAILY [facility]


   Last Admin: 11/22/19 09:37 Dose:  100 mg


Gabapentin (Neurontin)  300 mg PO BEDTIME [facility]


   Last Admin: 11/21/19 22:41 Dose:  300 mg


Insulin Aspart (Novolog)  0 unit SUBCUT QIDACANDBED [facility]; Protocol


   Last Admin: 11/22/19 07:35 Dose:  1 unit


Methylprednisolone Sodium Succinate (Solu-Medrol)  125 mg IVPUSH DAILY [facility]


   Last Admin: 11/22/19 09:39 Dose:  125 mg


Montelukast Sodium (Singulair)  10 mg PO DAILY [facility]


   Last Admin: 11/22/19 09:37 Dose:  10 mg


Pantoprazole Sodium (Protonix***)  40 mg PO DAILY [facility]


   Last Admin: 11/22/19 09:37 Dose:  40 mg


Liraglutide [Victoza (] 1.8 Mg)  1 each SUBCUT DAILY [facility]


   Last Admin: 11/22/19 10:27 Dose:  Not Given


Simvastatin (Zocor)  20 mg PO BEDTIME [facility]





Discontinued Medications





Albuterol/Ipratropium (Duoneb 3.0-0.5 Mg/3 Ml) Confirm Administered Dose 3 ml 

.ROUTE .STK-MED ONE


   Stop: 11/21/19 16:26


   Last Admin: 11/21/19 17:07 Dose:  3 ml


Aspirin (Aspirin)  324 mg PO ONETIME ONE


   Stop: 11/21/19 16:32


   Last Admin: 11/21/19 17:07 Dose:  324 mg


Insulin Aspart (Novolog)  0 unit SUBCUT TIDAC [facility]; Protocol


Insulin Aspart (Novolog)  0 unit SUBCUT QID [facility]; Protocol


   Last Admin: 11/22/19 01:02 Dose:  Not Given


Methylprednisolone Sodium Succinate (Solu-Medrol)  125 mg IVPUSH ONETIME ONE


   Stop: 11/21/19 16:33


   Last Admin: 11/21/19 17:09 Dose:  125 mg

## 2019-11-23 ENCOUNTER — HOSPITAL ENCOUNTER (EMERGENCY)
Dept: HOSPITAL 56 - MW.ED | Age: 79
Discharge: HOME | End: 2019-11-23
Payer: MEDICARE

## 2019-11-23 DIAGNOSIS — Z79.890: ICD-10-CM

## 2019-11-23 DIAGNOSIS — J44.1: Primary | ICD-10-CM

## 2019-11-23 DIAGNOSIS — Z79.899: ICD-10-CM

## 2019-11-23 DIAGNOSIS — I50.9: ICD-10-CM

## 2019-11-23 DIAGNOSIS — Z88.1: ICD-10-CM

## 2019-11-23 DIAGNOSIS — Z79.51: ICD-10-CM

## 2019-11-23 DIAGNOSIS — I11.0: ICD-10-CM

## 2019-11-23 DIAGNOSIS — Z88.2: ICD-10-CM

## 2019-11-23 DIAGNOSIS — E03.9: ICD-10-CM

## 2019-11-23 DIAGNOSIS — Z79.52: ICD-10-CM

## 2019-11-23 LAB
BUN SERPL-MCNC: 22 MG/DL (ref 7–18)
CHLORIDE SERPL-SCNC: 105 MMOL/L (ref 98–107)
CO2 SERPL-SCNC: 26.4 MMOL/L (ref 21–32)
GLUCOSE SERPL-MCNC: 150 MG/DL (ref 74–106)
POTASSIUM SERPL-SCNC: 3.6 MMOL/L (ref 3.5–5.1)
SODIUM SERPL-SCNC: 144 MMOL/L (ref 136–145)

## 2019-11-23 PROCEDURE — 93005 ELECTROCARDIOGRAM TRACING: CPT

## 2019-11-23 PROCEDURE — 80053 COMPREHEN METABOLIC PANEL: CPT

## 2019-11-23 PROCEDURE — 36415 COLL VENOUS BLD VENIPUNCTURE: CPT

## 2019-11-23 PROCEDURE — 96374 THER/PROPH/DIAG INJ IV PUSH: CPT

## 2019-11-23 PROCEDURE — 71045 X-RAY EXAM CHEST 1 VIEW: CPT

## 2019-11-23 PROCEDURE — 99285 EMERGENCY DEPT VISIT HI MDM: CPT

## 2019-11-23 PROCEDURE — 85025 COMPLETE CBC W/AUTO DIFF WBC: CPT

## 2019-11-23 PROCEDURE — 94640 AIRWAY INHALATION TREATMENT: CPT

## 2019-11-23 NOTE — CR
HISTORY:



Dyspnea and chest pain.



TECHNIQUE:



One view of the chest.



COMPARISON:



11/21/2019.



FINDINGS:



Cardiac size is within normal limits. There is no pulmonary vascular 

congestion. No acute lung infiltrate or pulmonary edema. No pneumothorax or 

pleural effusion.



IMPRESSION:



No acute disease.



Dictated by Jeremy Swain MD @ 11/23/2019 8:21:02 PM



Dictated by: Jeremy Swain MD @ 11/23/2019 20:21:44



(Electronically Signed)

## 2019-11-23 NOTE — EDM.PDOC
ED HPI GENERAL MEDICAL PROBLEM





- General


Chief Complaint: Respiratory Problem


Stated Complaint: TROUBLE BREATHING


Time Seen by Provider: 11/23/19 19:45


Source of Information: Reports: Patient


History Limitations: Reports: No Limitations





- History of Present Illness


INITIAL COMMENTS - FREE TEXT/NARRATIVE: 





HISTORY AND PHYSICAL:





History of present illness:


Patient is a 79-year-old female presents to the ED with complaint of shortness 

of breath and cough. Patient has history of COPD, was admitted 2 days ago for 

COPD exacerbation. She is on antibiotics and prednisone but states she did not 

 the inhalers because they were too expensive. She states she use to use 

a nebulizer but lost it when she moved to [place]. She denies chest pain, 

fevers, chills. 





Review of systems: 


As per history of present illness and below otherwise all systems reviewed and 

negative.





Past medical history: 


As per history of present illness and as reviewed below otherwise 

noncontributory.





Surgical history: 


As per history of present illness and as reviewed below otherwise 

noncontributory.





Social history: 


No reported history of drug or alcohol abuse.





Family history: 


As per history of present illness and as reviewed below otherwise 

noncontributory.





Physical exam:


General: Patient sitting comfortably in no acute distress and nontoxic appearing


HEENT: Atraumatic, normocephalic, pupils reactive, negative for conjunctival 

pallor or scleral icterus, mucous membranes moist, throat clear, neck supple, 

nontender, trachea midline. No meningeal signs. 


Lungs: Wheezing throughout all lung fields 


Heart: S1S2, regular, negative for clicks, rubs, or overt murmur.


Abdomen: Soft, nondistended, nontender. Negative for masses or 

hepatosplenomegaly. Negative for costovertebral tenderness. No rigidity, rebound

, guarding.


Pelvis: Stable nontender.


Genitourinary: Deferred.


Rectal: Deferred.


Extremities: Atraumatic, negative for cords or calf pain. Neurovascular 

unremarkable.


Neuro: Awake, alert, oriented. Cranial nerves II through XII unremarkable. 

Cerebellum unremarkable. Motor and sensory unremarkable throughout. Exam 

nonfocal.





Notes: Elevated white count likely due to steroid use, chest x-ray negative and 

patient is on antibiotics. Dr. Franks evaluated patient and agrees to plan of 

care. Patient will be discharged home, inhalers were provided from [employer] 

Patient has follow up with PCP on 12/5/19 and understands to return to ED if 

new or worsening symptoms. 





Diagnostics:


CBC, CMP, 





Therapeutics:


DuoNeb x 1 


125mg Solumedrol IV 





Prescriptions:








Impression: 


COPD exacerbation 





Plan:


Continue medications and use inhalers instructed


Follow-up with primary care provider


Return to ED as needed as discussed





Definitive disposition and diagnosis as appropriate pending reevaluation and 

review of above.





  ** low back


Pain Score (Numeric/FACES): 10





- Related Data


 Allergies











Allergy/AdvReac Type Severity Reaction Status Date / Time


 


cephalexin [From Keflex] Allergy  Hives Verified 11/23/19 19:38


 


Sulfa (Sulfonamide Allergy  Hives Verified 11/23/19 19:38





Antibiotics)     











Home Meds: 


 Home Meds





Acyclovir 400 mg PO DAILY 11/21/19 [History]


Allopurinol [Zyloprim] 100 mg PO DAILY 11/21/19 [History]


Apixaban [Eliquis] 5 mg PO BID 11/21/19 [History]


Cholecalciferol (Vitamin D3) [Vitamin D] 50,000 unit PO WEEKLY 11/21/19 [History

]


Furosemide [Lasix] 80 mg PO BID 11/21/19 [History]


Gabapentin [Neurontin] 100 mg PO DAILY 11/21/19 [History]


Gabapentin [Neurontin] 300 mg PO BEDTIME 11/21/19 [History]


Levothyroxine 125 mcg PO ACBREAKFAST 11/21/19 [History]


Liraglutide [Victoza] 1.8 mg SUBCUT DAILY 11/21/19 [History]


Meloxicam [Mobic] 7.5 mg PO BID 11/21/19 [History]


Montelukast [Singulair] 10 mg PO DAILY 11/21/19 [History]


Nystatin [Nystop] 60 gm TP BID 11/21/19 [History]


Potassium Chloride 32 meq PO BID 11/21/19 [History]


Simvastatin 20 mg PO BEDTIME 11/21/19 [History]


amLODIPine [Norvasc] 5 mg PO DAILY 11/21/19 [History]


metFORMIN [Glucophage XR] 500 mg PO BIDMEALS 11/21/19 [History]


Albuterol [Ventolin HFA] 1 puff INH Q4H PRN #1 puff 11/22/19 [Rx]


Fluticasone/Salmeterol [Advair 250-50 Diskus] 1 each IH BID #1 disk.w.dev 11/22/ 19 [Rx]


Levofloxacin [Levaquin] 500 mg PO DAILY #5 tablet 11/22/19 [Rx]


predniSONE [Prednisone] 50 mg PO DAILY #5 tablet 11/22/19 [Rx]











Past Medical History


Cardiovascular History: Reports: Heart Failure, Hypertension


Respiratory History: Reports: COPD


OB/GYN History: Reports: Pregnancy


Musculoskeletal History: Reports: Back Pain, Chronic


Endocrine/Metabolic History: Reports: Hypothyroidism





- Infectious Disease History


Infectious Disease History: Reports: Chicken Pox





Social & Family History





- Family History


Family Medical History: Noncontributory





- Tobacco Use


Smoking Status *Q: Never Smoker





- Caffeine Use


Caffeine Use: Reports: Coffee, Soda





- Recreational Drug Use


Recreational Drug Use: No





ED ROS GENERAL





- Review of Systems


Review Of Systems: Comprehensive ROS is negative, except as noted in HPI.





ED EXAM, GENERAL





- Physical Exam


Exam: See Below (see dictation)





Course





- Vital Signs


Last Recorded V/S: 


 Last Vital Signs











Temp  97 F   11/23/19 19:32


 


Pulse  111 H  11/23/19 20:30


 


Resp  22 H  11/23/19 20:30


 


BP  127/69   11/23/19 20:30


 


Pulse Ox  95   11/23/19 20:30














- Orders/Labs/Meds


Orders: 


 Active Orders 24 hr











 Category Date Time Status


 


 EKG Documentation Completion [RC] STAT Care  11/23/19 19:37 Active


 


 RT Aerosol Therapy [RC] ASDIRECTED Care  11/23/19 19:54 Active


 


 RT Aerosol Therapy [RC] ASDIRECTED Care  11/23/19 19:58 Active


 


 Sodium Chloride 0.9% [Saline Flush] Med  11/23/19 19:37 Active





 10 ml FLUSH ASDIRECTED PRN   


 


 Sodium Chloride 0.9% [Saline Flush] Med  11/23/19 19:37 Active





 2.5 ml FLUSH ASDIRECTED PRN   


 


 Saline Lock Insert [OM.PC] Stat Oth  11/23/19 19:37 Ordered








 Medication Orders





Sodium Chloride (Saline Flush)  10 ml FLUSH ASDIRECTED PRN


   PRN Reason: Keep Vein Open


Sodium Chloride (Saline Flush)  2.5 ml FLUSH ASDIRECTED PRN


   PRN Reason: Keep Vein Open








Labs: 


 Laboratory Tests











  11/23/19 11/23/19 Range/Units





  19:45 19:45 


 


WBC  17.13 H   (4.0-11.0)  K/uL


 


RBC  4.86   (4.30-5.90)  M/uL


 


Hgb  13.5   (12.0-16.0)  g/dL


 


Hct  42.5   (36.0-46.0)  %


 


MCV  87.4   (80.0-98.0)  fL


 


MCH  27.8   (27.0-32.0)  pg


 


MCHC  31.8   (31.0-37.0)  g/dL


 


RDW Std Deviation  46.2   (28.0-62.0)  fl


 


RDW Coeff of Joseph  15   (11.0-15.0)  %


 


Plt Count  356   (150-400)  K/uL


 


MPV  10.40   (7.40-12.00)  fL


 


Neut % (Auto)  78.4   (48.0-80.0)  %


 


Lymph % (Auto)  13.5 L   (16.0-40.0)  %


 


Mono % (Auto)  8.0   (0.0-15.0)  %


 


Eos % (Auto)  0.1   (0.0-7.0)  %


 


Baso % (Auto)  0.0   (0.0-1.5)  %


 


Neut # (Auto)  13.4 H   (1.4-5.7)  K/uL


 


Lymph # (Auto)  2.3   (0.6-2.4)  K/uL


 


Mono # (Auto)  1.4 H   (0.0-0.8)  K/uL


 


Eos # (Auto)  0.0   (0.0-0.7)  K/uL


 


Baso # (Auto)  0.0   (0.0-0.1)  K/uL


 


Nucleated RBC %  0.0   /100WBC


 


Nucleated RBCs #  0   K/uL


 


Sodium   144  (136-145)  mmol/L


 


Potassium   3.6  (3.5-5.1)  mmol/L


 


Chloride   105  ()  mmol/L


 


Carbon Dioxide   26.4  (21.0-32.0)  mmol/L


 


BUN   22 H  (7.0-18.0)  mg/dL


 


Creatinine   1.2 H  (0.6-1.0)  mg/dL


 


Est Cr Clr Drug Dosing   36.75  mL/min


 


Estimated GFR (MDRD)   52.8  ml/min


 


Glucose   150 H  ()  mg/dL


 


Calcium   8.5  (8.5-10.1)  mg/dL


 


Total Bilirubin   0.3  (0.2-1.0)  mg/dL


 


AST   25  (15-37)  IU/L


 


ALT   32  (14-63)  IU/L


 


Alkaline Phosphatase   76  ()  U/L


 


Total Protein   6.9  (6.4-8.2)  g/dL


 


Albumin   3.5  (3.4-5.0)  g/dL


 


Globulin   3.4  (2.6-4.0)  g/dL


 


Albumin/Globulin Ratio   1.0  (0.9-1.6)  











Meds: 


Medications











Generic Name Dose Route Start Last Admin





  Trade Name Freq  PRN Reason Stop Dose Admin


 


Sodium Chloride  10 ml  11/23/19 19:37  





  Saline Flush  FLUSH   





  ASDIRECTED PRN   





  Keep Vein Open   





     





     





     


 


Sodium Chloride  2.5 ml  11/23/19 19:37  





  Saline Flush  FLUSH   





  ASDIRECTED PRN   





  Keep Vein Open   





     





     





     














Discontinued Medications














Generic Name Dose Route Start Last Admin





  Trade Name Freq  PRN Reason Stop Dose Admin


 


Albuterol/Ipratropium  Confirm  11/23/19 19:37  11/23/19 19:58





  Duoneb 3.0-0.5 Mg/3 Ml  Administered  11/23/19 19:38  Not Given





  Dose   





  3 ml   





  .ROUTE   





  .STK-MED ONE   





     





     





     





     


 


Albuterol/Ipratropium  3 ml  11/23/19 19:54  11/23/19 19:58





  Duoneb 3.0-0.5 Mg/3 Ml  NEB  11/23/19 19:55  3 ml





  ONETIME ONE   Administration





     





     





     





     


 


Albuterol/Ipratropium  3 ml  11/23/19 19:57  11/23/19 19:59





  Duoneb 3.0-0.5 Mg/3 Ml  NEB  11/23/19 19:58  3 ml





  ONETIME ONE   Administration





     





     





     





     


 


Methylprednisolone Sodium Succinate  125 mg  11/23/19 19:54  11/23/19 19:59





  Solu-Medrol  IVPUSH  11/23/19 19:55  125 mg





  ONETIME ONE   Administration





     





     





     





     














Departure





- Departure


Time of Disposition: 20:40


Disposition: Home, Self-Care 01


Condition: Good


Clinical Impression: 


 COPD exacerbation








- Discharge Information


Referrals: 


PCP,None [Primary Care Provider] - 


Forms:  ED Department Discharge


Additional Instructions: 


The following information is given to patients seen in the emergency department 

who are being discharged to home. This information is to outline your options 

for follow-up care. We provide all patients seen in our emergency department 

with a follow-up referral.





The need for follow-up, as well as the timing and circumstances, are variable 

depending upon the specifics of your emergency department visit.





If you don't have a primary care physician on staff, we will provide you with a 

referral. We always advise you to contact your personal physician following an 

emergency department visit to inform them of the circumstance of the visit and 

for follow-up with them and/or the need for any referrals to a consulting 

specialist.





The emergency department will also refer you to a specialist when appropriate. 

This referral assures that you have the opportunity for follow-up care with a 

specialist. All of these measure are taken in an effort to provide you with 

optimal care, which includes your follow-up.





Under all circumstances we always encourage you to contact your private 

physician who remains a resource for coordinating your care. When calling for 

follow-up care, please make the office aware that this follow-up is from your 

recent emergency room visit. If for any reason you are refused follow-up, 

please contact the [facility] Emergency 

Department at [phone] and asked to speak to the emergency department 

charge nurse.





[facility]


Primary Care


[address] [place] [id number]


Phone: [phone]


Fax: [phone]





[address] [address]


Phone: [phone]


Fax: [phone]














Continue medications and use inhalers instructed


Follow-up with primary care provider


Return to ED as needed as discussed





- My Orders


Last 24 Hours: 


My Active Orders





11/23/19 19:37


EKG Documentation Completion [RC] STAT 


Sodium Chloride 0.9% [Saline Flush]   10 ml FLUSH ASDIRECTED PRN 


Sodium Chloride 0.9% [Saline Flush]   2.5 ml FLUSH ASDIRECTED PRN 


Saline Lock Insert [OM.PC] Stat 





11/23/19 19:54


RT Aerosol Therapy [RC] ASDIRECTED 





11/23/19 19:58


RT Aerosol Therapy [RC] ASDIRECTED 














- Assessment/Plan


Last 24 Hours: 


My Active Orders





11/23/19 19:37


EKG Documentation Completion [RC] STAT 


Sodium Chloride 0.9% [Saline Flush]   10 ml FLUSH ASDIRECTED PRN 


Sodium Chloride 0.9% [Saline Flush]   2.5 ml FLUSH ASDIRECTED PRN 


Saline Lock Insert [OM.PC] Stat 





11/23/19 19:54


RT Aerosol Therapy [RC] ASDIRECTED 





11/23/19 19:58


RT Aerosol Therapy [RC] ASDIRECTED

## 2020-01-20 ENCOUNTER — HOSPITAL ENCOUNTER (EMERGENCY)
Dept: HOSPITAL 56 - MW.ED | Age: 80
Discharge: HOME | End: 2020-01-20
Payer: MEDICARE

## 2020-01-20 DIAGNOSIS — E11.9: ICD-10-CM

## 2020-01-20 DIAGNOSIS — Z88.1: ICD-10-CM

## 2020-01-20 DIAGNOSIS — Z88.2: ICD-10-CM

## 2020-01-20 DIAGNOSIS — Z77.22: ICD-10-CM

## 2020-01-20 DIAGNOSIS — Z79.01: ICD-10-CM

## 2020-01-20 DIAGNOSIS — G89.29: ICD-10-CM

## 2020-01-20 DIAGNOSIS — I50.9: ICD-10-CM

## 2020-01-20 DIAGNOSIS — M54.5: Primary | ICD-10-CM

## 2020-01-20 DIAGNOSIS — Z79.84: ICD-10-CM

## 2020-01-20 DIAGNOSIS — E03.9: ICD-10-CM

## 2020-01-20 DIAGNOSIS — Z79.899: ICD-10-CM

## 2020-01-20 DIAGNOSIS — I11.0: ICD-10-CM

## 2020-01-20 DIAGNOSIS — J44.9: ICD-10-CM

## 2020-01-20 PROCEDURE — 99283 EMERGENCY DEPT VISIT LOW MDM: CPT

## 2020-01-20 NOTE — EDM.PDOC
ED [facility] GENERAL MEDICAL PROBLEM





- General


Chief Complaint: Back Pain or Injury


Stated Complaint: BACK PAIN


Time Seen by Provider: 01/20/20 08:01


Source of Information: Reports: Patient


History Limitations: Reports: No Limitations





- History of Present Illness


INITIAL COMMENTS - FREE TEXT/NARRATIVE: 


Patient is a 79-year-old female with past medical history of COPD, A. fib on 

Eliquis, chronic low back pain presenting with a chief complaint of 

exacerbation of her low back pain.  Patient states she is recently moved here 

from [place] approximately 2 months ago.  Patient states that while she was 

in [place] she was following with pain management and getting injections 

frequently in her back to help relieve the pain.  Patient notes that the pain 

worsened yesterday however was same and quality and location.  Patient had no 

traumatic injuries and no specific movements that seem to have exacerbated.  

Patient reports gradual increase in the level of pain.  Pain is isolated to the 

low back and does not radiate.  Patient denies any saddle paresthesias.  

Patient denies any lower extremity weakness.  Patient denies fevers.  Patient 

does report urinary incontinence which is been progressively getting worse over 

the past few weeks.  The urinary incontinence is associated with urgency and 

occurs primarily with coughing or sneezing.





In addition to that documented in the HPI above, the additional ROS was obtained

:


Constitutional: Denies fevers or chills


Eyes: Denies vision changes


ENMT: Denies sore throat


CV: Denies chest pain


Resp: Denies SOB


GI: Denies vomiting or diarrhea


: Denies painful urination


MSK: Denies recent trauma


Skin: Denies new rashes


Neuro: Denies new numbness or tingling or weakness


Endocrine: Denies unexpected weight loss


Heme: Denies bleeding disorders





I have reviewed the triage vital signs


Const: Well nourished, well developed, appears stated age


Eyes: PERRL, no conjunctival injection


HENT: NCAT, Neck supple without meningismus 


CV: RRR, Warm, well-perfused extremities


RESP: CTAB, Unlabored respiratory effort


GI: soft, non-tender, non-distended, no masses


MSK: No midline spinal tenderness noted.  Patient has tenderness palpation 

around the sacroiliac region with right greater than left.  No gross 

deformities appreciated


Skin: Warm, dry. No rashes


Neuro: Alert, CNs II-XII grossly intact. Sensation and motor function of 

extremities grossly intact.


Psych: Appropriate mood and affect





Assessment and plan:


Patient is a 79-year-old female presenting with acute on chronic low back pain.

  Patient not taking any pain medications at home.  Patient be given pain 

medications here.  Differential diagnosis considered was broad however I do not 

believe this is related to renal colic, cauda equina, epidural abscess.  Based 

on the history and exam.  Patient be given pain medications and reassess.  

Patient's alert lady has improved pain after administration of Flexeril and 

Tylenol.  Patient given red flags when to return to the emergency department.  

Patient given information regarding pain management for chronic pain and [place].

  All questions addressed and answered.








  ** Lower Back


Pain Score (Numeric/FACES): 8





- Related Data


 Allergies











Allergy/AdvReac Type Severity Reaction Status Date / Time


 


cephalexin [From Keflex] Allergy  Hives Verified 01/20/20 07:29


 


Sulfa (Sulfonamide Allergy  Hives Verified 01/20/20 07:29





Antibiotics)     











Home Meds: 


 Home Meds





Acyclovir 400 mg PO DAILY 11/21/19 [History]


Apixaban [Eliquis] 5 mg PO BID 11/21/19 [History]


Furosemide [Lasix] 80 mg PO BID 11/21/19 [History]


Gabapentin [Neurontin] 100 mg PO QAM 11/21/19 [History]


Gabapentin [Neurontin] 300 mg PO BEDTIME 11/21/19 [History]


Levothyroxine 125 mcg PO ACBREAKFAST 11/21/19 [History]


Liraglutide [Victoza] 1.8 mg SUBCUT DAILY 11/21/19 [History]


Meloxicam [Mobic] 7.5 mg PO BID 11/21/19 [History]


Montelukast [Singulair] 10 mg PO DAILY 11/21/19 [History]


Nystatin [Nystop] 60 gm TP BID PRN 11/21/19 [History]


Potassium Chloride 32 meq PO BID 11/21/19 [History]


Simvastatin 20 mg PO BEDTIME 11/21/19 [History]


allopurinoL [Zyloprim] 100 mg PO DAILY 11/21/19 [History]


amLODIPine [Norvasc] 5 mg PO DAILY 11/21/19 [History]


metFORMIN [Glucophage XR] 500 mg PO BIDMEALS 11/21/19 [History]


Albuterol [Ventolin HFA] 1 puff INH Q4H PRN #1 puff 11/22/19 [Rx]


predniSONE [Prednisone] 50 mg PO DAILY #5 tablet 11/22/19 [Rx]


Cholecalciferol (Vitamin D3) [Vitamin D3] 5,000 unit PO DAILY 01/20/20 [History]











Past Medical History


HEENT History: Reports: Cataract


Cardiovascular History: Reports: Heart Failure, Hypertension


Respiratory History: Reports: COPD


OB/GYN History: Reports: Pregnancy


Musculoskeletal History: Reports: Back Pain, Chronic


Neurological History: Reports: None


Psychiatric History: Reports: None


Endocrine/Metabolic History: Reports: Diabetes, Type II, Hypothyroidism


Hematologic History: Reports: None


Immunologic History: Reports: None


Oncologic (Cancer) History: Reports: None


Dermatologic History: Reports: None





- Infectious Disease History


Infectious Disease History: Reports: Chicken Pox, Measles





- Past Surgical History


Head Surgeries/Procedures: Reports: None


HEENT Surgical History: Reports: Tonsillectomy


Cardiovascular Surgical History: Reports: Cardiac Ablation


GI Surgical History: Reports: Cholecystectomy


Female  Surgical History: Reports: Hysterectomy


Musculoskeletal Surgical History: Reports: Other (See Below)


Other Musculoskeletal Surgeries/Procedures:: Left knee





Social & Family History





- Family History


Family Medical History: Noncontributory





- Tobacco Use


Smoking Status *Q: Never Smoker


Second Hand Smoke Exposure: Yes





- Caffeine Use


Caffeine Use: Reports: None





- Recreational Drug Use


Recreational Drug Use: No





ED ROS GENERAL





- Review of Systems


Review Of Systems: See Below





ED EXAM,LOWER BACK PAIN/INJURY





- Physical Exam


Exam: See Below





Course





- Vital Signs


Last Recorded V/S: 


 Last Vital Signs











Temp  36.7 C   01/20/20 07:30


 


Pulse  74   01/20/20 07:30


 


Resp  16   01/20/20 07:30


 


BP  138/57 L  01/20/20 07:30


 


Pulse Ox  95   01/20/20 07:30














- Orders/Labs/Meds


Meds: 


Medications














Discontinued Medications














Generic Name Dose Route Start Last Admin





  Trade Name Freq  PRN Reason Stop Dose Admin


 


Acetaminophen  650 mg  01/20/20 07:55  01/20/20 08:08





  Tylenol  PO  01/20/20 07:56  650 mg





  NOW ONE   Administration





     





     





     





     


 


Cyclobenzaprine HCl  10 mg  01/20/20 07:55  01/20/20 08:09





  Flexeril  PO  01/20/20 07:56  10 mg





  ONETIME ONE   Administration





     





     





     





     














Departure





- Departure


Time of Disposition: 08:43


Disposition: Home, Self-Care 01


Clinical Impression: 


 Low back pain








- Discharge Information


Referrals: 


PCP,None [Primary Care Provider] - 


Forms:  ED Department Discharge


Additional Instructions: 


The following information is given to patients seen in the emergency department 

who are being discharged to home. This information is to outline your options 

for follow-up care. We provide all patients seen in our emergency department 

with a follow-up referral.





The need for follow-up, as well as the timing and circumstances, are variable 

depending upon the specifics of your emergency department visit.





If you don't have a primary care physician on staff, we will provide you with a 

referral. We always advise you to contact your personal physician following an 

emergency department visit to inform them of the circumstance of the visit and 

for follow-up with them and/or the need for any referrals to a consulting 

specialist.





The emergency department will also refer you to a specialist when appropriate. 

This referral assures that you have the opportunity for follow-up care with a 

specialist. All of these measure are taken in an effort to provide you with 

optimal care, which includes your follow-up.





Under all circumstances we always encourage you to contact your private 

physician who remains a resource for coordinating your care. When calling for 

follow-up care, please make the office aware that this follow-up is from your 

recent emergency room visit. If for any reason you are refused follow-up, 

please contact the [facility] Emergency 

Department at [phone] and asked to speak to the emergency department 

charge nurse.








Sepsis Event Note





- Evaluation


Sepsis Screening Result: No Definite Risk





- Focused Exam


Vital Signs: 


 Vital Signs











  Temp Pulse Resp BP Pulse Ox


 


 01/20/20 07:30  36.7 C  74  16  138/57 L  95











Date Exam was Performed: 01/20/20


Time Exam was Performed: 08:42

## 2020-02-06 ENCOUNTER — HOSPITAL ENCOUNTER (EMERGENCY)
Dept: HOSPITAL 56 - MW.ED | Age: 80
Discharge: HOME | End: 2020-02-06
Payer: MEDICARE

## 2020-02-06 DIAGNOSIS — Z79.84: ICD-10-CM

## 2020-02-06 DIAGNOSIS — Z79.899: ICD-10-CM

## 2020-02-06 DIAGNOSIS — I10: ICD-10-CM

## 2020-02-06 DIAGNOSIS — J44.1: Primary | ICD-10-CM

## 2020-02-06 DIAGNOSIS — Z88.1: ICD-10-CM

## 2020-02-06 DIAGNOSIS — Z79.01: ICD-10-CM

## 2020-02-06 DIAGNOSIS — E03.9: ICD-10-CM

## 2020-02-06 DIAGNOSIS — Z88.2: ICD-10-CM

## 2020-02-06 DIAGNOSIS — E11.9: ICD-10-CM

## 2020-02-06 LAB
BUN SERPL-MCNC: 10 MG/DL (ref 7–18)
CHLORIDE SERPL-SCNC: 104 MMOL/L (ref 98–107)
CO2 SERPL-SCNC: 31.3 MMOL/L (ref 21–32)
GLUCOSE SERPL-MCNC: 114 MG/DL (ref 74–106)
POTASSIUM SERPL-SCNC: 3.3 MMOL/L (ref 3.5–5.1)
SODIUM SERPL-SCNC: 144 MMOL/L (ref 136–145)

## 2020-02-06 PROCEDURE — 85025 COMPLETE CBC W/AUTO DIFF WBC: CPT

## 2020-02-06 PROCEDURE — 71046 X-RAY EXAM CHEST 2 VIEWS: CPT

## 2020-02-06 PROCEDURE — 84484 ASSAY OF TROPONIN QUANT: CPT

## 2020-02-06 PROCEDURE — 93005 ELECTROCARDIOGRAM TRACING: CPT

## 2020-02-06 PROCEDURE — 36415 COLL VENOUS BLD VENIPUNCTURE: CPT

## 2020-02-06 PROCEDURE — 80048 BASIC METABOLIC PNL TOTAL CA: CPT

## 2020-02-06 PROCEDURE — 99285 EMERGENCY DEPT VISIT HI MDM: CPT

## 2020-02-06 NOTE — EDM.PDOC
ED HPI GENERAL MEDICAL PROBLEM





- General


Chief Complaint: Respiratory Problem


Stated Complaint: COUGHING,HARD TIME BREATHING


Time Seen by Provider: 20 20:29





- History of Present Illness


INITIAL COMMENTS - FREE TEXT/NARRATIVE: 





HPI


79-year-old female with a history of COPD presents for evaluation of 2 days of 

worsening coughing, wheezing, and shortness of breath. Denies fevers, or 

significant change in sputum production. No chest pain. 


* PE risk factors: denies history of DVT or PE, recent immobilization, leg 

trauma, estrogen use, surgery in the last four weeks, hemoptysis, or malignancy 

in the last 6 months. Patient is on long-term NOAC.


* Inhaler(s): albuterol.


* CHF: denies weight gain, orthopnea, and notes compliance with diuretic use.





Triage note: AOx4, states chest heaviness & cough for 2days with chronic SOB.





M/S/F/SocHx notable for: COPD, low back pain, hypothyroidism, CHF (inferred 

from Lasix use), anticoagulation on Apixiban; remainder reviewed with patient 

and in chart. 





ROS: Negative constitutional, eye, cardiovascular, pulmonary, GI, , MSK, skin

, neurologic, psychiatric, endocrine unless noted in the HPI.





Exam


HR 90, RR 24, /68, T 36.6C, SaO2 90% at 8:20 PM.


Gen: Pleasant, non-toxic appearing, not in extremis.


HEENT: NC, AT, PEERL, EOMI, trachea midline.


Resp: Diffuse expiratory wheezing throughout all lung fields, minimally 

increased work of breathing.


Card: RRR with no M/R/G, no crackles in lung bases, no pedal edema, no JVD 

appreciated. 


GI: Non-tender to palpation throughout all quadrants, no rebound or guarding.


MSK: No chest wall TTP. No visible deformities, strength and tone WNL.


Skin: Normal color with no visible lesions.


Neuro: alert and oriented  3, no facial asymmetry, vision and hearing WNL.


Psych: Mood and affect appropriate. 





Labs / Imaging (pertinent): 


WBC 9.48, HB 14.0, sodium 144, potassium 3.3, troponin <0.050.





CXR: nothing acute is seen on a PA and lateral chest x-ray. 





EKG: SR at 92 bpm, no DE segment depressions, no new ST segment changes, new 

LBBB, or T-wave changes that would suggest acute ischemia. 





MDM


Previous chart, nursing note, and vitals reviewed. 


A: 79-year-old female with a history of COPD presents for evaluation of 2 days 

of worsening coughing, wheezing, and shortness of breath.





DDx: COPD exacerbation, pneumonia, bronchitis, pneumothorax, anxiety, PE, CHF 

exacerbation, pleural effusion, pericardial effusion, ACS.





Evaluation: 


* COPD exacerbation - known history along with today's presentation (increased 

WOB with wheezing throughout all lung fields) most consistent with COPD 

exacerbation.


* Pneumonia - as the CXR is without focal infiltrate and the patient is 

afebrile and without significant sputum production, doubt pneumonia.


* Bronchitis - doubt given the lack of productive cough or systemic symptoms.


* Pneumothorax - no evidence by CXR.


* Anxiety - patient clinically without evidence of appreciable anxiety on exam.


* PE - low clinical suspicion given history and alternate diagnosis, further 

risk stratification (e.g.) Well's not indicated.


* CHF - no evidence by CXR, auscultation, or physical exam.


* Pleural effusion - CXR without evidence of effusions.


* Pericardial effusion - doubt pericardial effusion given an alternate diagnosis

, the lack of cardiomegaly on CXR and normal heart sounds.


* ACS - doubt ACS given a non-ischemic EKG and a negative troponin greater than 

six hours from maximal symptom onset.





ED Course: Given 1 duoneb, 16 mg dexamethasone, albuterol nebulizer, Tessalon 

Perles and 500 mg Azithromycin.





Disposition: Discharged with rx for 40 mg prednisone x 4 days, Tessalon Perles, 

and Azithromycin 





Impression: COPD exacerbation.





- Related Data


 Allergies











Allergy/AdvReac Type Severity Reaction Status Date / Time


 


cephalexin [From Keflex] Allergy  Hives Verified 20 20:24


 


Sulfa (Sulfonamide Allergy  Hives Verified 20 20:24





Antibiotics)     











Home Meds: 


 Home Meds





Acyclovir 400 mg PO DAILY 19 [History]


Apixaban [Eliquis] 5 mg PO BID 19 [History]


Furosemide [Lasix] 80 mg PO BID 19 [History]


Gabapentin [Neurontin] 100 mg PO QAM 19 [History]


Gabapentin [Neurontin] 300 mg PO BEDTIME 19 [History]


Levothyroxine 125 mcg PO ACBREAKFAST 19 [History]


Liraglutide [Victoza] 1.8 mg SUBCUT DAILY 19 [History]


Meloxicam [Mobic] 7.5 mg PO BID 19 [History]


Montelukast [Singulair] 10 mg PO DAILY 19 [History]


Nystatin [Nystop] 60 gm TP BID PRN 19 [History]


Potassium Chloride 32 meq PO BID 19 [History]


Simvastatin 20 mg PO BEDTIME 19 [History]


allopurinoL [Zyloprim] 100 mg PO DAILY 19 [History]


amLODIPine [Norvasc] 5 mg PO DAILY 19 [History]


metFORMIN [Glucophage XR] 500 mg PO BIDMEALS 19 [History]


Albuterol [Ventolin HFA] 1 puff INH Q4H PRN #1 puff 19 [Rx]


predniSONE [Prednisone] 50 mg PO DAILY #5 tablet 19 [Rx]


Cholecalciferol (Vitamin D3) [Vitamin D3] 5,000 unit PO DAILY 20 [History]


Cyclobenzaprine [Flexeril] 10 mg PO DAILY #30 tab 20 [Rx]


Azithromycin 250 mg PO DAILY #4 tablet 20 [Rx]


Benzonatate [Tessalon Perle] 100 mg PO TID #20 capsule 20 [Rx]


predniSONE [Prednisone] 40 mg PO DAILY #8 tablet 20 [Rx]











Past Medical History


HEENT History: Reports: Cataract


Cardiovascular History: Reports: Heart Failure, Hypertension


Respiratory History: Reports: COPD


Gastrointestinal History: Reports: None


Genitourinary History: Reports: None


OB/GYN History: Reports: Pregnancy


Musculoskeletal History: Reports: Back Pain, Chronic


Neurological History: Reports: None


Psychiatric History: Reports: None


Endocrine/Metabolic History: Reports: Diabetes, Type II, Hypothyroidism


Insulin Pump Model and : None


Hematologic History: Reports: None


Immunologic History: Reports: None


Oncologic (Cancer) History: Reports: None


Dermatologic History: Reports: None





- Infectious Disease History


Infectious Disease History: Reports: Chicken Pox, Measles





- Past Surgical History


Head Surgeries/Procedures: Reports: None


HEENT Surgical History: Reports: Tonsillectomy


Cardiovascular Surgical History: Reports: Cardiac Ablation


GI Surgical History: Reports: Cholecystectomy


Female  Surgical History: Reports: Hysterectomy


Musculoskeletal Surgical History: Reports: Other (See Below)


Other Musculoskeletal Surgeries/Procedures:: Left knee





Social & Family History





- Family History


Family Medical History: Noncontributory





- Tobacco Use


Smoking Status *Q: Never Smoker





- Caffeine Use


Caffeine Use: Reports: None





- Recreational Drug Use


Recreational Drug Use: No





ED ROS GENERAL





- Review of Systems


Review Of Systems: See Below





ED EXAM, GENERAL





- Physical Exam


Exam: See Below





Course





- Vital Signs


Last Recorded V/S: 





 Last Vital Signs











Temp  36.6 C   20 20:20


 


Pulse  90   20 20:20


 


Resp  24 H  20 20:20


 


BP  169/68 H  20 20:20


 


Pulse Ox  90 L  20 20:20














- Orders/Labs/Meds


Orders: 





 Active Orders 24 hr











 Category Date Time Status


 


 EKG 12 Lead [EKG Documentation Completion] [RC] STAT Care  20 20:33 

Active


 


 RT Aerosol Therapy [RC] ASDIRECTED Care  20 20:45 Active


 


 Azithromycin [Zithromax] Med  20 20:45 Active





 500 mg PO Q24H   








 Medication Orders





Azithromycin (Zithromax)  500 mg PO Q24H ZULEYKA


   Last Admin: 20 20:59  Dose: 500 mg








Labs: 





 Laboratory Tests











  20 Range/Units





  20:40 20:40 


 


WBC  9.48   (4.0-11.0)  K/uL


 


RBC  5.12   (4.30-5.90)  M/uL


 


Hgb  14.0   (12.0-16.0)  g/dL


 


Hct  44.3   (36.0-46.0)  %


 


MCV  86.5   (80.0-98.0)  fL


 


MCH  27.3   (27.0-32.0)  pg


 


MCHC  31.6   (31.0-37.0)  g/dL


 


RDW Std Deviation  50.3   (28.0-62.0)  fl


 


RDW Coeff of Joseph  16 H   (11.0-15.0)  %


 


Plt Count  319   (150-400)  K/uL


 


MPV  10.10   (7.40-12.00)  fL


 


Neut % (Auto)  49.0   (48.0-80.0)  %


 


Lymph % (Auto)  40.9 H   (16.0-40.0)  %


 


Mono % (Auto)  8.0   (0.0-15.0)  %


 


Eos % (Auto)  1.9   (0.0-7.0)  %


 


Baso % (Auto)  0.2   (0.0-1.5)  %


 


Neut # (Auto)  4.6   (1.4-5.7)  K/uL


 


Lymph # (Auto)  3.9 H   (0.6-2.4)  K/uL


 


Mono # (Auto)  0.8   (0.0-0.8)  K/uL


 


Eos # (Auto)  0.2   (0.0-0.7)  K/uL


 


Baso # (Auto)  0.0   (0.0-0.1)  K/uL


 


Nucleated RBC %  0.0   /100WBC


 


Nucleated RBCs #  0   K/uL


 


Sodium   144  (136-145)  mmol/L


 


Potassium   3.3 L  (3.5-5.1)  mmol/L


 


Chloride   104  ()  mmol/L


 


Carbon Dioxide   31.3  (21.0-32.0)  mmol/L


 


BUN   10  (7.0-18.0)  mg/dL


 


Creatinine   0.8  (0.6-1.0)  mg/dL


 


Est Cr Clr Drug Dosing   55.13  mL/min


 


Estimated GFR (MDRD)   > 60.0  ml/min


 


Glucose   114 H  ()  mg/dL


 


Calcium   9.5  (8.5-10.1)  mg/dL


 


Troponin I   < 0.050  (0.000-0.056)  ng/mL











Meds: 





Medications











Generic Name Dose Route Start Last Admin





  Trade Name Freq  PRN Reason Stop Dose Admin


 


Azithromycin  500 mg  20 20:45  20 20:59





  Zithromax  PO   500 mg





  Q24H ZULEYKA   Administration





     





     





     





     














Discontinued Medications














Generic Name Dose Route Start Last Admin





  Trade Name Freq  PRN Reason Stop Dose Admin


 


Albuterol  5 mg  20 20:44  20 21:07





  Proventil Neb Soln  NEB  20 20:45  5 mg





  ONETIME ONE   Administration





     





     





     





     


 


Albuterol/Ipratropium  Confirm  20 20:14  20 21:02





  Duoneb 3.0-0.5 Mg/3 Ml  Administered  02/06/20 20:15  3 ml





  Dose   Administration





  3 ml   





  .ROUTE   





  .STK-MED ONE   





     





     





     





     


 


Benzonatate  200 mg  20 20:43  20 21:00





  Tessalon Perles  PO  20 20:44  200 mg





  ONETIME ONE   Administration





     





     





     





     


 


Dexamethasone  16 mg  20 20:43  20 20:59





  Dexamethasone  PO  20 20:44  16 mg





  ONETIME ONE   Administration





     





     





     





     














Departure





- Departure


Time of Disposition: 21:15


Disposition: Home, Self-Care 01


Clinical Impression: 


 COPD exacerbation








- Discharge Information


Prescriptions: 


Azithromycin 250 mg PO DAILY #4 tablet


Benzonatate [Tessalon Perle] 100 mg PO TID #20 capsule


predniSONE [Prednisone] 40 mg PO DAILY #8 tablet


Additional Instructions: 


You were in seen in the [facility] 

Emergency Department for evaluation of a COPD exacerbation. Please read and 

follow all of the instructions below.





Please follow up with your primary care physician within 24 to 48 hours for 

repeat evaluation and further care. When calling for follow-up care, please 

make the office aware that this follow-up is from your recent emergency room 

visit. If for any reason you are refused follow-up, please contact the [facility] Emergency Department at [phone] 

and asked to speak to the emergency department charge nurse. 





Your care today was limited to identifying and treating emergent medical 

problems only. Many people have subtle differences in their test results that 

require follow up with their outpatient physician(s) to correctly determine if 

this represents a normal variation or concerning abnormality with respect to 

your specific health. The care given to you today was limited to identifying 

and treating emergent medical problems - you need to request a copy of all of 

your medical records from today's visit and follow up with your outpatient 

physician(s) to review both today's visit and your overall health. If you have 

any new symptoms or if you are at all concerned about your health please return 

immediately to the emergency department.





Prescriptions:


If you are uninsured or have financial difficulties with filling your 

prescription(s), you may consider using a free pharmacy discount service such 

as [employer] ([employer]) or [employer] ([employer]). These services allow 

you to search for a medication on your phone (or computer) and obtain a coupon 

that usually has a significant discount from the list price at a pharmacy. Your 

physician as well as [facility] does not have a financial 

relationship with either of these services. You may also wish to speak with 

your physician to determine if lower cost prescriptions are possible.





Obtaining primary care:


1.   [facility] provides pediatrics (children), 

family medicine (children, adults, and some obstetrical care), and internal 

medicine (adults). Further specialty care is also available. Same day 

appointments are available. They may be contacted at [phone] and are open 

Monday through Friday 8 AM to 5 PM. The [facility] are 

located at [facility], [address] [place] [address].


2.   [facility] offers family medicine, internal 

medicine, womens health, and further specialty care. [facility] 

may be contacted at [phone]. [facility] is 

located at [address] [place], [address].


3.   If you have health insurance, please also contact your insurer for a list 

of accepting providers under your policy, you may contact these providers for 

further health care.





Occupational health:


Work related injuries may consider following up with [facility], [phone]. Occupational health services are located at [address] [place] [address] and are open Monday through Friday from 7:

30 am to 5:00 pm.





Obstetrical and Gynecological Care:


[facility], [phone], Monday through Friday 8 AM to 

5 PM. [address] [place] [address].





Eyecare:


If you have an eye injury you should follow up with your ophthalmologist or 

with [facility], at [phone] or [phone]

, they are located at [address] [place] [address].





Dental Care 


   Manuel RIVAS DDS. [address] [place], [place]. Ph. [phone]


   Shadi RIVAS DDS MS. [address] [facility] [address], [place], [place]. Ph. [phone]

[phone]


   Zoltan CATHLEEN CARPENTER DDS. [address] [place] [address], [place], [place]. Ph. [phone]


   Saji Benoit DDS. [address] [facility] [address] [place], [place]. Ph. [phone]


   Inocencio SHAIKH DDS PC. [address] [facility] [address] [place], [place]. Ph. [phone]


   Ana Maria GOETZ DDS. [address] [facility]. Ph. [phone]


   [facility]. [address] [place], [place]. Ph. [phone]


   [facility]. [address] [place] [address], [place], [place]. 

Ph. [phone]


   [facility] [address] [place] [address]. Ph. [phone]


   [facility]. [address] [place] [address] [address] [place], [place]. Ph. [phone]


   [facility].  [address], [place], [place]. Ph. 

[phone]





COPD exacerbation. 


COPD exacerbations occur when a virus, bacteria, or pollution worsens your 

underlying chronic obstructive pulmonary disease. This is treated with anti-

inflammatories (prednisone), bronchodilators (albuterol), and antibiotics. 


   Take the medications as prescribed.


   Please return to the emergency department if you develop worsening 

shortness of breath, fevers, chills, chest pain, or are otherwise concerned 

about your health.


   If you have diabetes will need to check your blood glucose more frequently 

because the steroids will increase your blood sugar.


   Please read the drug information below regarding the medications were 

prescribed.





To reduce the recurrence of COPD exacerbations you should do the following:


* If you are smoker, stop smoking. 


* You should also receive a yearly influenza vaccine and a pneumococcal 

pneumonia vaccine every 5 years.





Azithromycin:


This is an antibiotic that will help reduce the amount of bacteria in your lungs

, it will also reduce the amount of inflammation in your lungs.





AZITHROMYCIN SIDE EFFECTS:


   Stomach upset, diarrhea/loose stools, nausea, vomiting, or abdominal pain 

may occur. If any of these effects persist or worsen, tell your doctor or 

pharmacist promptly.


   Tell your doctor right away if any of these unlikely but serious side 

effects occur: hearing changes (such as decreased hearing, deafness), eye 

problems (such as drooping eyelids, blurred vision), difficulty speaking/

swallowing, muscle weakness, signs of liver problems (such as unusual tiredness

, persistent nausea/vomiting, severe stomach/abdominal pain, yellowing eyes/skin

, dark urine).


   Get medical help right away if any of these rare but serious side effects 

occur: fast/irregular heartbeat, severe dizziness, fainting.


   This medication may rarely cause a severe intestinal condition (Clostridium 

difficile-associated diarrhea) due to a resistant bacteria. This condition may 

occur during treatment or weeks to months after treatment has stopped. Do not 

use anti-diarrhea products or narcotic pain medications if you have any of the 

following symptoms because these products may make them worse. Tell your doctor 

right away if you develop: persistent diarrhea, abdominal or stomach pain/

cramping, blood/mucus in your stool.


   Use of this medication for prolonged or repeated periods may result in oral 

thrush or a new yeast infection. Contact your doctor if you notice white 

patches in your mouth, a change in vaginal discharge, or other new symptoms.


   A very serious allergic reaction to this drug is rare. However, get medical 

help right away if you notice any symptoms of a serious allergic reaction, 

including: rash, itching/swelling (especially of the face/tongue/throat), 

severe dizziness, trouble breathing.


   An allergic reaction to this medication may return even if you stop the 

drug. If you have an allergic reaction, continue to watch for any of the above 

symptoms for several days after your last dose.


   This is not a complete list of possible side effects. If you notice other 

effects not listed above, contact your doctor or pharmacist.


   Azithromycin may cause a condition that affects the heart rhythm (QT 

prolongation). QT prolongation can rarely cause serious (rarely fatal) fast/

irregular heartbeat and other symptoms (such as severe dizziness, fainting) 

that need medical attention right away.


   The risk of QT prolongation may be increased if you have certain medical 

conditions or are taking other drugs that may cause QT prolongation. Before 

using azithromycin, tell your doctor or pharmacist of all the drugs you take 

and if you have any of the following conditions: certain heart problems (heart 

failure, slow heartbeat, QT prolongation in the EKG), family history of certain 

heart problems (QT prolongation in the EKG, sudden cardiac death).


   Low levels of potassium or magnesium in the blood may also increase your 

risk of QT prolongation. This risk may increase if you use certain drugs (such 

as diuretics/"water pills") or if you have conditions such as severe sweating, 

diarrhea, or vomiting. Talk to your doctor about using azithromycin safely.


   Many drugs besides azithromycin may affect the heart rhythm (QT prolongation

), including amiodarone, disopyramide, dofetilide, dronedarone, ibutilide, 

pimozide, procainamide, quinidine, sotalol, among others.


   This drug passes into breast milk. Consult your doctor before breast-

feeding.


   Although most antibiotics are unlikely to affect hormonal birth control 

such as pills, patch, or ring, a few antibiotics (such as rifampin, rifabutin) 

can decrease their effectiveness. This could result in pregnancy. If you use 

hormonal birth control, ask your doctor or pharmacist for more details.





Prednisone:


   Many people experience no side effects, while others may experience nausea, 

loss of appetite, or difficulty sleeping.


   If you have diabetes, check your blood glucose more frequently as this will 

likely increase your blood glucose.


   Take this medication by mouth, with food or milk to prevent stomach upset, 

as directed by your doctor. You may mix the medication in juice or applesauce 

before taking it. If you are prescribed only one dose per day, take it in the 

morning before 9 A.M.





PREDNISONE SIDE EFFECTS:


   Nausea, vomiting, loss of appetite, heartburn, trouble sleeping, increased 

sweating, or acne may occur. If any of these effects persist or worsen, tell 

your doctor or pharmacist promptly.


   Tell your doctor right away if any of these unlikely but serious side 

effects occur: muscle pain/cramps, irregular heartbeat, weakness, swelling hands

/ankles/feet, unusual weight gain, signs of infection (such as fever, 

persistent sore throat), vision problems (such as blurred vision), vomit that 

looks like coffee grounds, black/bloody stools, severe stomach/abdominal pain, 

mental/mood changes (such as depression, mood swings, agitation), slow wound 

healing, thinning skin, bone pain, menstrual period changes, puffy face, 

seizures, easy bruising/bleeding.


   This medication may rarely make your blood sugar level rise, which can 

cause or worsen diabetes. Tell your doctor right away if you develop symptoms 

of high blood sugar, such as increased thirst and urination. If you already 

have diabetes, be sure to check your blood sugars regularly. Your doctor may 

need to adjust your diabetes medication, exercise program, or diet.


   A very serious allergic reaction to this product is rare. However, get 

medical help right away if you notice any symptoms of a serious allergic 

reaction, including: rash, itching/swelling (especially of the face/tongue/

throat), severe dizziness, trouble breathing.


   This is not a complete list of possible side effects. If you notice other 

effects not listed above, contact your doctor or pharmacist.


   Before using this medication, tell your doctor or pharmacist your medical 

history, especially of: current/past infections (such as fungal infections, 

tuberculosis, herpes), heart problems (such as heart failure, recent heart 

attack), high blood pressure, thyroid problems, kidney disease, liver disease, 

stomach/intestinal problems (such as ulcer, diverticulitis), bone loss (

osteoporosis), mental/mood disorders (such as psychosis, anxiety, depression), 

eye diseases (such as cataracts, glaucoma), diabetes, mineral imbalance (such 

as low level of potassium/calcium in the blood), seizures, blood clots, 

bleeding problems.


   This medicine may cause stomach bleeding. Daily use of alcohol while using 

this medicine may increase your risk for stomach bleeding. Limit alcoholic 

beverages. Consult your doctor or pharmacist for more information.


   During pregnancy, this medication should be used only when clearly needed. 

It may rarely harm an unborn baby. Discuss the risks and benefits with your 

doctor. Infants born to mothers who have been using this medication for an 

extended period of time may have hormone problems. Tell your doctor right away 

if you notice symptoms such as persistent nausea/vomiting, severe diarrhea, or 

weakness in your .


   This medication passes into breast milk but is unlikely to harm a nursing 

infant. Consult your doctor before breast-feeding.





Albuterol (Brand Name: Salbutamol)


   This drug is used to open the airways in lung diseases where spasm may 

cause breathing problems.


   Please use this medication as prescribed. Please call your doctor if you 

are needing to use if more frequently than prescribed.


   Use your inhaler with a spacer every time.


   This medication may make you feel jittery and have a faster heart rate.  


   If you have diabetes, please check your blood glucose more frequently as it 

may be higher. 





ALBUTEROL WARNING/CAUTION: Even though it may be rare, some people may have 

very bad and sometimes deadly side effects when taking a drug. Tell your doctor 

or get medical help right away if you have any of the following signs or 

symptoms that may be related to a very bad side effect:


   Signs of an allergic reaction, like rash; hives; itching; red, swollen, 

blistered, or peeling skin with or without fever; wheezing; tightness in the 

chest or throat; trouble breathing or talking; unusual hoarseness; or swelling 

of the mouth, face, lips, tongue, or throat.


   Signs of low potassium levels like muscle pain or weakness, muscle cramps, 

or a heartbeat that does not feel normal.


   If you are not able to get the breathing attack under control. Get help 

right away.


   Chest pain or pressure or a fast heartbeat.


   Very nervous and excitable.


   Very bad headache.


   Very bad dizziness or passing out.


   This drug may sometimes cause very bad breathing problems. This may be life-

threatening. When this happens with a puffer (inhaler) or with liquid for 

breathing in, most of the time it happens right after a dose and after the 

first use of a new canister or vial of this drug. If you have trouble breathing

, breathing that is worse, wheezing, or coughing, get medical help right away.





ALBUTEROL WARNING/CAUTION: Even though it may be rare, some people may have 

very bad and sometimes deadly side effects when taking a drug. Tell your doctor 

or get medical help right away if you have any of the following signs or 

symptoms that may be related to a very bad side effect:


   Signs of an allergic reaction, like rash; hives; itching; red, swollen, 

blistered, or peeling skin with or without fever; wheezing; tightness in the 

chest or throat; trouble breathing or talking; unusual hoarseness; or swelling 

of the mouth, face, lips, tongue, or throat.


   Signs of low potassium levels like muscle pain or weakness, muscle cramps, 

or a heartbeat that does not feel normal.


   If you are not able to get the breathing attack under control. Get help 

right away.


   Chest pain or pressure or a fast heartbeat.


   This drug may sometimes cause very bad breathing problems. This may be life-

threatening. When this happens with a puffer (inhaler) or with liquid for 

breathing in, most of the time it happens right after a dose and after the 

first use of a new canister or vial of this drug. If you have trouble breathing

, breathing that is worse, wheezing, or coughing, get medical help right away.


   Pain when passing urine.


   Trouble passing urine.


A very bad skin reaction (Khan-Tate syndrome/toxic epidermal necrolysis) 

may happen. It can cause very bad health problems that may not go away, and 

sometimes death. Get medical help right away if you have signs like red, swollen

, blistered, or peeling skin (with or without fever); red or irritated eyes; or 

sores in your mouth, throat, nose, or eyes.








Sepsis Event Note





- Evaluation


Sepsis Screening Result: No Definite Risk





- Focused Exam


Vital Signs: 





 Vital Signs











  Temp Pulse Resp BP Pulse Ox


 


 20 20:20  36.6 C  90  24 H  169/68 H  90 L











Date Exam was Performed: 20


Time Exam was Performed: 21:13





- My Orders


Last 24 Hours: 





My Active Orders





20 20:33


EKG 12 Lead [EKG Documentation Completion] [RC] STAT 





20 20:45


RT Aerosol Therapy [RC] ASDIRECTED 


Azithromycin [Zithromax]   500 mg PO Q24H 














- Assessment/Plan


Last 24 Hours: 





My Active Orders





20 20:33


EKG 12 Lead [EKG Documentation Completion] [RC] STAT 





20 20:45


RT Aerosol Therapy [RC] ASDIRECTED 


Azithromycin [Zithromax]   500 mg PO Q24H

## 2020-02-06 NOTE — CR
Chest: 2 views of the chest were obtained.

 

Comparison: Prior chest x-ray of 11/23/19.

 

Heart size is normal.  Tortuous thoracic aorta is seen.  Lungs show no

 acute parenchymal change.  Bony structures are osteopenic.

 

Impression:

1.  Nothing acute is seen on AP and lateral chest x-ray.

 

Diagnostic code #2

 

This report was dictated in [place] Standard Time

## 2020-03-16 ENCOUNTER — HOSPITAL ENCOUNTER (OUTPATIENT)
Dept: HOSPITAL 56 - MW.ED | Age: 80
Setting detail: OBSERVATION
LOS: 2 days | Discharge: HOME | End: 2020-03-18
Attending: INTERNAL MEDICINE | Admitting: INTERNAL MEDICINE
Payer: MEDICARE

## 2020-03-16 DIAGNOSIS — E11.9: ICD-10-CM

## 2020-03-16 DIAGNOSIS — Z79.01: ICD-10-CM

## 2020-03-16 DIAGNOSIS — Z88.2: ICD-10-CM

## 2020-03-16 DIAGNOSIS — Z98.890: ICD-10-CM

## 2020-03-16 DIAGNOSIS — Z99.89: ICD-10-CM

## 2020-03-16 DIAGNOSIS — Z79.899: ICD-10-CM

## 2020-03-16 DIAGNOSIS — E66.9: ICD-10-CM

## 2020-03-16 DIAGNOSIS — Z88.1: ICD-10-CM

## 2020-03-16 DIAGNOSIS — J44.1: Primary | ICD-10-CM

## 2020-03-16 DIAGNOSIS — Z03.818: ICD-10-CM

## 2020-03-16 DIAGNOSIS — Z88.8: ICD-10-CM

## 2020-03-16 DIAGNOSIS — I25.10: ICD-10-CM

## 2020-03-16 DIAGNOSIS — I89.0: ICD-10-CM

## 2020-03-16 DIAGNOSIS — I11.0: ICD-10-CM

## 2020-03-16 DIAGNOSIS — I50.9: ICD-10-CM

## 2020-03-16 DIAGNOSIS — G89.29: ICD-10-CM

## 2020-03-16 DIAGNOSIS — G47.33: ICD-10-CM

## 2020-03-16 DIAGNOSIS — Z79.890: ICD-10-CM

## 2020-03-16 DIAGNOSIS — E03.9: ICD-10-CM

## 2020-03-16 LAB
BUN SERPL-MCNC: 10 MG/DL (ref 7–18)
CHLORIDE SERPL-SCNC: 102 MMOL/L (ref 98–107)
CO2 SERPL-SCNC: 32.8 MMOL/L (ref 21–32)
GLUCOSE SERPL-MCNC: 106 MG/DL (ref 74–106)
POTASSIUM SERPL-SCNC: 3.3 MMOL/L (ref 3.5–5.1)
SODIUM SERPL-SCNC: 143 MMOL/L (ref 136–145)

## 2020-03-16 PROCEDURE — 93005 ELECTROCARDIOGRAM TRACING: CPT

## 2020-03-16 PROCEDURE — U0002 COVID-19 LAB TEST NON-CDC: HCPCS

## 2020-03-16 PROCEDURE — 94664 DEMO&/EVAL PT USE INHALER: CPT

## 2020-03-16 PROCEDURE — 83880 ASSAY OF NATRIURETIC PEPTIDE: CPT

## 2020-03-16 PROCEDURE — 87040 BLOOD CULTURE FOR BACTERIA: CPT

## 2020-03-16 PROCEDURE — 97161 PT EVAL LOW COMPLEX 20 MIN: CPT

## 2020-03-16 PROCEDURE — 84484 ASSAY OF TROPONIN QUANT: CPT

## 2020-03-16 PROCEDURE — U0001 2019-NCOV DIAGNOSTIC P: HCPCS

## 2020-03-16 PROCEDURE — 36415 COLL VENOUS BLD VENIPUNCTURE: CPT

## 2020-03-16 PROCEDURE — 96366 THER/PROPH/DIAG IV INF ADDON: CPT

## 2020-03-16 PROCEDURE — 80048 BASIC METABOLIC PNL TOTAL CA: CPT

## 2020-03-16 PROCEDURE — 96365 THER/PROPH/DIAG IV INF INIT: CPT

## 2020-03-16 PROCEDURE — 83605 ASSAY OF LACTIC ACID: CPT

## 2020-03-16 PROCEDURE — G0378 HOSPITAL OBSERVATION PER HR: HCPCS

## 2020-03-16 PROCEDURE — 99285 EMERGENCY DEPT VISIT HI MDM: CPT

## 2020-03-16 PROCEDURE — 71045 X-RAY EXAM CHEST 1 VIEW: CPT

## 2020-03-16 PROCEDURE — 87804 INFLUENZA ASSAY W/OPTIC: CPT

## 2020-03-16 PROCEDURE — 85025 COMPLETE CBC W/AUTO DIFF WBC: CPT

## 2020-03-16 PROCEDURE — 82962 GLUCOSE BLOOD TEST: CPT

## 2020-03-16 PROCEDURE — 97530 THERAPEUTIC ACTIVITIES: CPT

## 2020-03-16 PROCEDURE — 94640 AIRWAY INHALATION TREATMENT: CPT

## 2020-03-16 PROCEDURE — 96375 TX/PRO/DX INJ NEW DRUG ADDON: CPT

## 2020-03-16 NOTE — EDM.PDOC
ED HPI GENERAL MEDICAL PROBLEM





- General


Chief Complaint: Respiratory Problem


Stated Complaint: COUGH AND WEAK


Time Seen by Provider: 03/16/20 23:05


Source of Information: Reports: Patient


History Limitations: Reports: No Limitations





- History of Present Illness


INITIAL COMMENTS - FREE TEXT/NARRATIVE: 


HISTORY OF PRESENT ILLNESS:  Patient is a 79-year-old female with history of 

COPD, CHF, sleep apnea who presents to the ER for complaints of cough and 

shortness of breath with wheezing for the past 2 days.  Cough is moist.  Denies 

any hemoptysis.  No chest pain or abdominal pain.  Has had diarrhea 2 days ago 

which is now resolved.  Has any recent international travel.  Is not on home 

oxygen.  Patient uses a CPAP machine.  States she has been compliant with her 

diuretic.  Denies any increased swelling from baseline to her lower 

extremities.  Patient is not a smoker but states that she contracted COPD from 

secondhand smoke.








REVIEW OF SYSTEMS:  Other than the symptoms associated with the present events, 

the following is reported with regard to recent health:  


General:  (-) fever.  


HENT:  (-) congestion. 


 Respiratory:  (+) cough.  


Cardiovascular:  (-) chest pain.  


GI:  (-) abdominal pain.  


:  (-) urinary complaints. 


 Musculoskeletal:  (-) other aches or pains. 


 Endocrine:  (-) generalized weakness.  


Neurological:  (-) localized weakness.  


Skin: (-) rash








 PAST MEDICAL HISTORY: reviewed as per nursing notes


 SOCIAL HISTORY:  reviewed as per nursing notes,


 MEDICATIONS:  Per nurse's note


 ALLERGIES:  Per nurse's note, reviewed by me 














PHYSICAL EXAMINATION:


 GENERALIZED APPEARANCE: well developed, well nourished in mild distress


 VITAL SIGNS:  Per nurse's note, reviewed by me 


 SKIN:  Warm, dry; (-) cyanosis; (-) rash.


 HEAD:  (-) scalp swelling, (-) tenderness.


 EYES:  (-) conjunctival pallor, (-) scleral icterus.


 ENMT:   (-) stridor; mucous membranes moist.


 NECK:  (-) tenderness, (-) stiffness,


 CHEST AND RESPIRATORY:  (-) rales, (-) rhonchi, (+) wheezes; breath sounds 

equal bilaterally.


 HEART AND CARDIOVASCULAR:  (-) irregularity; (-) murmur, (-) gallop.


 ABDOMEN AND GI:  Soft; (-) tenderness, (-) guarding, (-) rebound, (-) palpable 

masses,


 EXTREMITIES:  (-) deformity, (-) edema.  


 NEURO AND PSYCH: Alert.  Cranial nerves grossly intact; strength symmetric. 

gait steady


   


 DIAGNOSTICS:











CXR: as read by radiologist, report reviewed by myself


EKG: ectopic atrial rhythm at 84 bpm. lad. q waves v1-2. no st elevation. 


labs ordered and reviewed


COVID-19: pending








EMERGENCY DEPARTMENT COURSE AND TREATMENT:  Patient's condition improved during 

Emergency Department evaluation.  given duoneb, solumedrol 125 mg IV, Levaquin 

was administered after blood cultures x 2 drawn. Labs and diagnostics reviewed.

  Patient still with pulse ox of 90 to 92% on room air following DuoNeb x3.  

She will require admission.  Case discussed with Dr. Wall who kindly agrees to 

admit.














PLAN AND FOLLOW-UP:  admit 


 








  ** Lower Back


Pain Score (Numeric/FACES): 9





- Related Data


 Allergies











Allergy/AdvReac Type Severity Reaction Status Date / Time


 


cephalexin [From Keflex] Allergy  Hives Verified 03/17/20 02:55


 


Sulfa (Sulfonamide Allergy  Hives Verified 03/17/20 02:55





Antibiotics)     











Home Meds: 


 Home Meds





Acyclovir 400 mg PO DAILY 11/21/19 [History]


Apixaban [Eliquis] 5 mg PO BID 11/21/19 [History]


Furosemide [Lasix] 80 mg PO BID 11/21/19 [History]


Gabapentin [Neurontin] 100 mg PO QAM 11/21/19 [History]


Gabapentin [Neurontin] 300 mg PO BEDTIME 11/21/19 [History]


Levothyroxine 125 mcg PO ACBREAKFAST 11/21/19 [History]


Liraglutide [Victoza] 1.8 mg SUBCUT DAILY 11/21/19 [History]


Meloxicam [Mobic] 7.5 mg PO BID 11/21/19 [History]


Montelukast [Singulair] 10 mg PO DAILY 11/21/19 [History]


Nystatin [Nystop] 60 gm TP BID PRN 11/21/19 [History]


Potassium Chloride 32 meq PO BID 11/21/19 [History]


Simvastatin 20 mg PO BEDTIME 11/21/19 [History]


allopurinoL [Zyloprim] 100 mg PO DAILY 11/21/19 [History]


amLODIPine [Norvasc] 5 mg PO DAILY 11/21/19 [History]


metFORMIN [Glucophage XR] 500 mg PO BIDMEALS 11/21/19 [History]


Albuterol [Ventolin HFA] 1 puff INH Q4H PRN #1 puff 11/22/19 [Rx]


predniSONE [Prednisone] 50 mg PO DAILY #5 tablet 11/22/19 [Rx]


Cholecalciferol (Vitamin D3) [Vitamin D3] 5,000 unit PO DAILY 01/20/20 [History]


Cyclobenzaprine [Flexeril] 10 mg PO DAILY #30 tab 01/20/20 [Rx]


Azithromycin 250 mg PO DAILY #4 tablet 02/06/20 [Rx]


Benzonatate [Tessalon Perle] 100 mg PO TID #20 capsule 02/06/20 [Rx]


predniSONE [Prednisone] 40 mg PO DAILY #8 tablet 02/06/20 [Rx]











Past Medical History


HEENT History: Reports: Cataract


Cardiovascular History: Reports: Heart Failure, Hypertension


Respiratory History: Reports: COPD


Gastrointestinal History: Reports: None


Genitourinary History: Reports: None


OB/GYN History: Reports: Pregnancy


Musculoskeletal History: Reports: Back Pain, Chronic


Neurological History: Reports: None


Psychiatric History: Reports: None


Endocrine/Metabolic History: Reports: Diabetes, Type II, Hypothyroidism


Insulin Pump Model and : None


Hematologic History: Reports: None


Immunologic History: Reports: None


Oncologic (Cancer) History: Reports: None


Dermatologic History: Reports: None





- Infectious Disease History


Infectious Disease History: Reports: Chicken Pox, Measles





- Past Surgical History


Head Surgeries/Procedures: Reports: None


HEENT Surgical History: Reports: Tonsillectomy


Cardiovascular Surgical History: Reports: Cardiac Ablation


GI Surgical History: Reports: Cholecystectomy


Female  Surgical History: Reports: Hysterectomy


Musculoskeletal Surgical History: Reports: Other (See Below)


Other Musculoskeletal Surgeries/Procedures:: Left knee





Social & Family History





- Family History


Family Medical History: Noncontributory





- Tobacco Use


Smoking Status *Q: Never Smoker


Second Hand Smoke Exposure: Yes





- Caffeine Use


Caffeine Use: Reports: None





- Recreational Drug Use


Recreational Drug Use: No





ED ROS GENERAL





- Review of Systems


Review Of Systems: See Below (see dictation)





ED EXAM, GENERAL





- Physical Exam


Exam: See Below (see dictation)





Course





- Vital Signs


Last Recorded V/S: 


 Last Vital Signs











Temp  97.4 F   03/17/20 04:00


 


Pulse  98   03/17/20 04:00


 


Resp  18   03/17/20 04:00


 


BP  132/84   03/17/20 04:00


 


Pulse Ox  94 L  03/17/20 04:00














- Orders/Labs/Meds


Orders: 


 Active Orders 24 hr











 Category Date Time Status


 


 EKG Documentation Completion [RC] STAT Care  03/16/20 23:16 Active


 


 RT Aerosol Therapy [RC] ASDIRECTED Care  03/16/20 23:18 Active


 


 CORONAVIRUS (COVID-19) PCR [MREF] Stat Lab  03/16/20 23:32 Ordered


 


 CULTURE BLOOD [BC] Stat Lab  03/16/20 23:40 Received


 


 CULTURE BLOOD [BC] Stat Lab  03/16/20 23:45 Results


 


 Blood Culture x2 Reflex Set [OM.PC] Stat Oth  03/16/20 23:16 Ordered


 


 Isolation [COMM] Routine Oth  03/16/20 21:21 Active








 Medication Orders





Albuterol/Ipratropium (Duoneb 3.0-0.5 Mg/3 Ml)  3 ml NEB Q6HRRT [facility]


Methylprednisolone Sodium Succinate (Solu-Medrol)  125 mg IVPUSH DAILY [facility]








Labs: 


 Laboratory Tests











  03/16/20 03/16/20 03/16/20 Range/Units





  23:22 23:22 23:22 


 


WBC  15.87 H    (4.0-11.0)  K/uL


 


RBC  5.91 H    (4.30-5.90)  M/uL


 


Hgb  16.4 H    (12.0-16.0)  g/dL


 


Hct  51.6 H    (36.0-46.0)  %


 


MCV  87.3    (80.0-98.0)  fL


 


MCH  27.7    (27.0-32.0)  pg


 


MCHC  31.8    (31.0-37.0)  g/dL


 


RDW Std Deviation  48.8    (28.0-62.0)  fl


 


RDW Coeff of Joseph  15    (11.0-15.0)  %


 


Plt Count  388    (150-400)  K/uL


 


MPV  10.30    (7.40-12.00)  fL


 


Neut % (Auto)  65.3    (48.0-80.0)  %


 


Lymph % (Auto)  26.0    (16.0-40.0)  %


 


Mono % (Auto)  7.6    (0.0-15.0)  %


 


Eos % (Auto)  0.8    (0.0-7.0)  %


 


Baso % (Auto)  0.3    (0.0-1.5)  %


 


Neut # (Auto)  10.4 H    (1.4-5.7)  K/uL


 


Lymph # (Auto)  4.1 H    (0.6-2.4)  K/uL


 


Mono # (Auto)  1.2 H    (0.0-0.8)  K/uL


 


Eos # (Auto)  0.1    (0.0-0.7)  K/uL


 


Baso # (Auto)  0.0    (0.0-0.1)  K/uL


 


Nucleated RBC %  0.0    /100WBC


 


Nucleated RBCs #  0    K/uL


 


Lactate   2.0   (0.20-2.00)  mmol/L


 


Sodium    143  (136-145)  mmol/L


 


Potassium    3.3 L  (3.5-5.1)  mmol/L


 


Chloride    102  ()  mmol/L


 


Carbon Dioxide    32.8 H  (21.0-32.0)  mmol/L


 


BUN    10  (7.0-18.0)  mg/dL


 


Creatinine    1.0  (0.6-1.0)  mg/dL


 


Est Cr Clr Drug Dosing    44.10  mL/min


 


Estimated GFR (MDRD)    53.8  ml/min


 


Glucose    106  ()  mg/dL


 


Calcium    9.7  (8.5-10.1)  mg/dL


 


Troponin I    < 0.050  (0.000-0.056)  ng/mL


 


B-Natriuretic Peptide     (<100)  PG/ML














  03/16/20 Range/Units





  23:22 


 


WBC   (4.0-11.0)  K/uL


 


RBC   (4.30-5.90)  M/uL


 


Hgb   (12.0-16.0)  g/dL


 


Hct   (36.0-46.0)  %


 


MCV   (80.0-98.0)  fL


 


MCH   (27.0-32.0)  pg


 


MCHC   (31.0-37.0)  g/dL


 


RDW Std Deviation   (28.0-62.0)  fl


 


RDW Coeff of Joseph   (11.0-15.0)  %


 


Plt Count   (150-400)  K/uL


 


MPV   (7.40-12.00)  fL


 


Neut % (Auto)   (48.0-80.0)  %


 


Lymph % (Auto)   (16.0-40.0)  %


 


Mono % (Auto)   (0.0-15.0)  %


 


Eos % (Auto)   (0.0-7.0)  %


 


Baso % (Auto)   (0.0-1.5)  %


 


Neut # (Auto)   (1.4-5.7)  K/uL


 


Lymph # (Auto)   (0.6-2.4)  K/uL


 


Mono # (Auto)   (0.0-0.8)  K/uL


 


Eos # (Auto)   (0.0-0.7)  K/uL


 


Baso # (Auto)   (0.0-0.1)  K/uL


 


Nucleated RBC %   /100WBC


 


Nucleated RBCs #   K/uL


 


Lactate   (0.20-2.00)  mmol/L


 


Sodium   (136-145)  mmol/L


 


Potassium   (3.5-5.1)  mmol/L


 


Chloride   ()  mmol/L


 


Carbon Dioxide   (21.0-32.0)  mmol/L


 


BUN   (7.0-18.0)  mg/dL


 


Creatinine   (0.6-1.0)  mg/dL


 


Est Cr Clr Drug Dosing   mL/min


 


Estimated GFR (MDRD)   ml/min


 


Glucose   ()  mg/dL


 


Calcium   (8.5-10.1)  mg/dL


 


Troponin I   (0.000-0.056)  ng/mL


 


B-Natriuretic Peptide  6  (<100)  PG/ML











Meds: 


Medications











Generic Name Dose Route Start Last Admin





  Trade Name Freq  PRN Reason Stop Dose Admin


 


Albuterol/Ipratropium  3 ml  03/17/20 06:00  





  Duoneb 3.0-0.5 Mg/3 Ml  NEB   





  Q6HRRT ZULEYKA   





     





     





     





     


 


Methylprednisolone Sodium Succinate  125 mg  03/17/20 09:00  





  Solu-Medrol  IVPUSH   





  DAILY ZULEYKA   





     





     





     





     














Discontinued Medications














Generic Name Dose Route Start Last Admin





  Trade Name Freq  PRN Reason Stop Dose Admin


 


Albuterol/Ipratropium  9 ml  03/16/20 23:18  03/16/20 23:45





  Duoneb 3.0-0.5 Mg/3 Ml  NEB  03/16/20 23:19  9 ml





  ONETIME ONE   Administration





     





     





     





     


 


Levofloxacin/Dextrose 750 mg/  150 mls @ 100 mls/hr  03/16/20 23:17  03/16/20 23

:48





  Premix  IV  03/17/20 00:46  100 mls/hr





  ONETIME ONE   Administration





     





     





     





     


 


Methylprednisolone Sodium Succinate  125 mg  03/16/20 23:18  03/16/20 23:47





  Solu-Medrol  IVPUSH  03/16/20 23:19  125 mg





  ONETIME ONE   Administration





     





     





     





     


 


Potassium Chloride  20 meq  03/17/20 00:28  03/17/20 01:35





  Klor-Con M20  PO  03/17/20 00:29  20 meq





  ONETIME ONE   Administration





     





     





     





     














Departure





- Departure


Time of Disposition: 01:35


Disposition: Refer to Observation


Clinical Impression: 


 COPD exacerbation








- Discharge Information





Sepsis Event Note





- Evaluation


Sepsis Screening Result: No Definite Risk





- Focused Exam


Vital Signs: 


 Vital Signs











  Temp Pulse Resp BP Pulse Ox


 


 03/17/20 01:35  97.6 F  104 H  24 H  162/82 H  90 L


 


 03/17/20 01:24  97.1 F  104 H  20  104/83  92 L


 


 03/16/20 23:56   98  22 H  109/73  97


 


 03/16/20 23:04  97.7 F  94  18  103/73  94 L











Date Exam was Performed: 03/17/20


Time Exam was Performed: 05:15





- My Orders


Last 24 Hours: 


My Active Orders





03/16/20 23:16


EKG Documentation Completion [RC] STAT 


Blood Culture x2 Reflex Set [OM.PC] Stat 





03/16/20 23:18


RT Aerosol Therapy [RC] ASDIRECTED 





03/16/20 23:32


CORONAVIRUS (COVID-19) PCR [MREF] Stat 





03/16/20 23:40


CULTURE BLOOD [BC] Stat 





03/16/20 23:45


CULTURE BLOOD [BC] Stat 














- Assessment/Plan


Last 24 Hours: 


My Active Orders





03/16/20 23:16


EKG Documentation Completion [RC] STAT 


Blood Culture x2 Reflex Set [OM.PC] Stat 





03/16/20 23:18


RT Aerosol Therapy [RC] ASDIRECTED 





03/16/20 23:32


CORONAVIRUS (COVID-19) PCR [MREF] Stat 





03/16/20 23:40


CULTURE BLOOD [BC] Stat 





03/16/20 23:45


CULTURE BLOOD [BC] Stat

## 2020-03-16 NOTE — CR
INDICATION: prior sent. chest pain. 1 image



TECHNIQUE:



Chest 1 view. 



COMPARISON:



2/6/20



FINDINGS:



Cardiovascular and mediastinum: Heart size and vasculature are normal in 

caliber and appearance.  Mediastinum is within normal limits.  



Lungs and pleural space: Lungs are clear.  No sign of infiltrate or mass.  

No sign of pleural effusion.  No pneumothorax.  



Bones and soft tissues: No significant findings.  



IMPRESSION:



Unremarkable chest.



Dictated by: Demond James MD @ 03/16/2020 23:55:24



(Electronically Signed)

## 2020-03-17 LAB
BUN SERPL-MCNC: 13 MG/DL (ref 7–18)
CHLORIDE SERPL-SCNC: 103 MMOL/L (ref 98–107)
CO2 SERPL-SCNC: 27 MMOL/L (ref 21–32)
GLUCOSE SERPL-MCNC: 206 MG/DL (ref 74–106)
POTASSIUM SERPL-SCNC: 3.2 MMOL/L (ref 3.5–5.1)
SODIUM SERPL-SCNC: 142 MMOL/L (ref 136–145)

## 2020-03-17 RX ADMIN — METHYLPREDNISOLONE SODIUM SUCCINATE SCH MG: 125 INJECTION, POWDER, FOR SOLUTION INTRAMUSCULAR; INTRAVENOUS at 08:44

## 2020-03-17 RX ADMIN — FLUTICASONE PROPIONATE AND SALMETEROL SCH INHALATION: 50; 250 POWDER RESPIRATORY (INHALATION) at 11:30

## 2020-03-17 RX ADMIN — FLUTICASONE PROPIONATE AND SALMETEROL SCH INHALATION: 50; 250 POWDER RESPIRATORY (INHALATION) at 21:23

## 2020-03-17 NOTE — PCM.HP.2
H&P History of Present Illness





- General


Date of Service: 03/17/20


Admit Problem/Dx: 


 Admission Diagnosis/Problem





Admission Diagnosis/Problem      COPD, Moderate chronic obstructive pulmonary


                                 disease








Source of Information: Patient


History Limitations: Reports: No Limitations





- History of Present Illness


Initial Comments - Free Text/Narative: 





This 79 barbara old female with pmh of COPD, HTN, Afib s/p ablation 2016 on Eliquis

, KAVITHA on CPAP, obesity, DM type 2, and chronic pain presented to the ED with 

complaints of cough, dyspnea and not feeling well. She reports she started 

having some difficultly breathing 2 days ago, has felt warm at home and 

sweating at night. She Denies over fevers, but some chillls. Reports non 

productive dry cough. No significant sinus congestion or pressure. No ear pain. 

No sore throat. She denies chest pain or palpitations. She denies abdominal pain

, reports diarrhea a few days ago with nausea, but none since then. She denies 

recent travel. Reports a boy was with her grand daughter last week, [place] She had no direct contact with him. She denies urinary concerns 

or neurological deficits. No tobacco or vaping, no recreational drug use or 

alcohol use.





In the ED leukocytosis 15,870 noted with elevated hgb 16.4 and hct 51.6. K+ 3.3

, bicarb elevated 32.8. Troponin negative. CXR negative. She was given Duonebs, 

Solumedrol and Levaquin. Symptoms improved in the ED. Influenza was negative, 

corona virus pending. She will be admitted for COPD exacerbation related to URI.





She was seenin primary care clinic, with Dr Champagne 2/11 for similar symptoms. 

Was started on Trelegy for maintenance inhaler, encouraged to continue 

albuterol. 


  ** Lower Back


Pain Score (Numeric/FACES): 9





- Related Data


Allergies/Adverse Reactions: 


 Allergies











Allergy/AdvReac Type Severity Reaction Status Date / Time


 


cephalexin [From Keflex] Allergy  Hives Verified 03/17/20 02:55


 


Sulfa (Sulfonamide Allergy  Hives Verified 03/17/20 02:55





Antibiotics)     











Home Medications: 


 Home Meds





Acyclovir 400 mg PO DAILY 11/21/19 [History]


Apixaban [Eliquis] 5 mg PO BID 11/21/19 [History]


Furosemide [Lasix] 80 mg PO BID 11/21/19 [History]


Gabapentin [Neurontin] 100 mg PO QAM 11/21/19 [History]


Gabapentin [Neurontin] 300 mg PO BEDTIME 11/21/19 [History]


Levothyroxine 125 mcg PO ACBREAKFAST 11/21/19 [History]


Liraglutide [Victoza] 1.8 mg SUBCUT DAILY 11/21/19 [History]


Meloxicam [Mobic] 7.5 mg PO BID 11/21/19 [History]


Montelukast [Singulair] 10 mg PO DAILY 11/21/19 [History]


Nystatin [Nystop] 60 gm TP BID PRN 11/21/19 [History]


Potassium Chloride 32 meq PO BID 11/21/19 [History]


Simvastatin 20 mg PO BEDTIME 11/21/19 [History]


allopurinoL [Zyloprim] 100 mg PO DAILY 11/21/19 [History]


amLODIPine [Norvasc] 5 mg PO DAILY 11/21/19 [History]


metFORMIN [Glucophage XR] 500 mg PO BIDMEALS 11/21/19 [History]


Albuterol [Ventolin HFA] 1 puff INH Q4H PRN #1 puff 11/22/19 [Rx]


predniSONE [Prednisone] 50 mg PO DAILY #5 tablet 11/22/19 [Rx]


Cholecalciferol (Vitamin D3) [Vitamin D3] 5,000 unit PO DAILY 01/20/20 [History]


Cyclobenzaprine [Flexeril] 10 mg PO DAILY #30 tab 01/20/20 [Rx]


Benzonatate [Tessalon Perle] 100 mg PO TID #20 capsule 02/06/20 [Rx]


predniSONE [Prednisone] 40 mg PO DAILY #8 tablet 02/06/20 [Rx]











Past Medical History


HEENT History: Reports: Cataract


Cardiovascular History: Reports: Afib, Arrhythmia (PVC and paroxysmal SVT), CAD

, Heart Failure, Hypertension


Respiratory History: Reports: COPD, Sleep Apnea (uses CPAP)


Gastrointestinal History: Reports: None


Genitourinary History: Reports: None


OB/GYN History: Reports: Pregnancy


Musculoskeletal History: Reports: Back Pain, Chronic


Neurological History: Reports: None.  Denies: CVA, TIA


Psychiatric History: Reports: None


Endocrine/Metabolic History: Reports: Diabetes, Type II, Hypothyroidism, Obesity

/BMI 30+, Other (See Below) (lyphmedema)


Insulin Pump Model and : None


Hematologic History: Reports: None


Immunologic History: Reports: None


Oncologic (Cancer) History: Reports: None


Dermatologic History: Reports: None





- Infectious Disease History


Infectious Disease History: Reports: Chicken Pox, Measles





- Past Surgical History


Head Surgeries/Procedures: Reports: None


HEENT Surgical History: Reports: Tonsillectomy


Cardiovascular Surgical History: Reports: Cardiac Ablation


GI Surgical History: Reports: Cholecystectomy


Female  Surgical History: Reports: Hysterectomy


Musculoskeletal Surgical History: Reports: Other (See Below)


Other Musculoskeletal Surgeries/Procedures:: Left knee





Social & Family History





- Family History


Family Medical History: Noncontributory





- Tobacco Use


Smoking Status *Q: Never Smoker


Second Hand Smoke Exposure: Yes





- Caffeine Use


Caffeine Use: Reports: None





- Recreational Drug Use


Recreational Drug Use: No





- Living Situation & Occupation


Living situation: Reports: with Family


Occupation: Retired





H&P Review of Systems





- Review of Systems:


Review Of Systems: See Below


General: Reports: Fever, Chills, Malaise


HEENT: Reports: No Symptoms.  Denies: Headaches, Sinus Congestion, Sore Throat


Pulmonary: Reports: Shortness of Breath, Wheezing, Cough.  Denies: Sputum, 

Hemoptysis


Cardiovascular: Reports: Dyspnea on Exertion.  Denies: Chest Pain


Gastrointestinal: Reports: No Symptoms, Diarrhea (few days ago, none since), 

Nausea (few days ago, none since).  Denies: Abdominal Pain


Genitourinary: Reports: No Symptoms.  Denies: Dysuria, Frequency, Urgency


Skin: Reports: No Symptoms.  Denies: Erythema, Wound


Psychiatric: Reports: No Symptoms.  Denies: Confusion


Neurological: Reports: No Symptoms.  Denies: Headache, Numbness, Paresthesia


Hematologic/Lymphatic: Reports: No Symptoms


Immunologic: Reports: No Symptoms





Exam





- Exam


Exam: See Below





- Vital Signs


Vital Signs: 


 Last Vital Signs











Temp  97.9 F   03/17/20 08:42


 


Pulse  100   03/17/20 08:42


 


Resp  20   03/17/20 08:42


 


BP  148/66 H  03/17/20 08:42


 


Pulse Ox  97   03/17/20 08:42











Weight: 106.594 kg





- Exam


General: Alert, Oriented, Cooperative


HEENT: Conjunctiva Clear, Mucosa Moist & Pink, Posterior Pharynx Clear


Neck: Supple, Trachea Midline.  No: JVD


Lungs: Decreased Breath Sounds (bilaterally), Crackles (fine crackles 

bilaterally)


Cardiovascular: Regular Rate, Regular Rhythm, Normal S1, Normal S2


GI/Abdominal Exam: Normal Bowel Sounds, Soft, Non-Tender, Other (obeses abdomen)


Back Exam: Normal Inspection, Full Range of Motion


Extremities: Normal Inspection, Normal Range of Motion, Other (lyphmedema noted 

to bilateral legs, no overt edeme or pitting edema. She reports legs are the 

same as her normal no increase in size)


Skin: Warm, Dry


Neuro Extensive - Mental Status: Alert, Oriented x3


Neuro Extensive - Motor, Sensory, Reflexes: CN II-XII Intact


Psychiatric: Alert, Normal Affect, Normal Mood





- Patient Data


Lab Results Last 24 hrs: 


 Laboratory Results - last 24 hr











  03/16/20 03/16/20 03/16/20 Range/Units





  23:22 23:22 23:22 


 


WBC  15.87 H    (4.0-11.0)  K/uL


 


RBC  5.91 H    (4.30-5.90)  M/uL


 


Hgb  16.4 H    (12.0-16.0)  g/dL


 


Hct  51.6 H    (36.0-46.0)  %


 


MCV  87.3    (80.0-98.0)  fL


 


MCH  27.7    (27.0-32.0)  pg


 


MCHC  31.8    (31.0-37.0)  g/dL


 


RDW Std Deviation  48.8    (28.0-62.0)  fl


 


RDW Coeff of Joseph  15    (11.0-15.0)  %


 


Plt Count  388    (150-400)  K/uL


 


MPV  10.30    (7.40-12.00)  fL


 


Neut % (Auto)  65.3    (48.0-80.0)  %


 


Lymph % (Auto)  26.0    (16.0-40.0)  %


 


Mono % (Auto)  7.6    (0.0-15.0)  %


 


Eos % (Auto)  0.8    (0.0-7.0)  %


 


Baso % (Auto)  0.3    (0.0-1.5)  %


 


Neut # (Auto)  10.4 H    (1.4-5.7)  K/uL


 


Lymph # (Auto)  4.1 H    (0.6-2.4)  K/uL


 


Mono # (Auto)  1.2 H    (0.0-0.8)  K/uL


 


Eos # (Auto)  0.1    (0.0-0.7)  K/uL


 


Baso # (Auto)  0.0    (0.0-0.1)  K/uL


 


Nucleated RBC %  0.0    /100WBC


 


Nucleated RBCs #  0    K/uL


 


Lactate   2.0   (0.20-2.00)  mmol/L


 


Sodium    143  (136-145)  mmol/L


 


Potassium    3.3 L  (3.5-5.1)  mmol/L


 


Chloride    102  ()  mmol/L


 


Carbon Dioxide    32.8 H  (21.0-32.0)  mmol/L


 


BUN    10  (7.0-18.0)  mg/dL


 


Creatinine    1.0  (0.6-1.0)  mg/dL


 


Est Cr Clr Drug Dosing    44.10  mL/min


 


Estimated GFR (MDRD)    53.8  ml/min


 


Glucose    106  ()  mg/dL


 


POC Glucose     ()  mg/dL


 


Calcium    9.7  (8.5-10.1)  mg/dL


 


Troponin I    < 0.050  (0.000-0.056)  ng/mL


 


B-Natriuretic Peptide     (<100)  PG/ML














  03/16/20 03/17/20 03/17/20 Range/Units





  23:22 05:58 08:31 


 


WBC     (4.0-11.0)  K/uL


 


RBC     (4.30-5.90)  M/uL


 


Hgb     (12.0-16.0)  g/dL


 


Hct     (36.0-46.0)  %


 


MCV     (80.0-98.0)  fL


 


MCH     (27.0-32.0)  pg


 


MCHC     (31.0-37.0)  g/dL


 


RDW Std Deviation     (28.0-62.0)  fl


 


RDW Coeff of Joseph     (11.0-15.0)  %


 


Plt Count     (150-400)  K/uL


 


MPV     (7.40-12.00)  fL


 


Neut % (Auto)     (48.0-80.0)  %


 


Lymph % (Auto)     (16.0-40.0)  %


 


Mono % (Auto)     (0.0-15.0)  %


 


Eos % (Auto)     (0.0-7.0)  %


 


Baso % (Auto)     (0.0-1.5)  %


 


Neut # (Auto)     (1.4-5.7)  K/uL


 


Lymph # (Auto)     (0.6-2.4)  K/uL


 


Mono # (Auto)     (0.0-0.8)  K/uL


 


Eos # (Auto)     (0.0-0.7)  K/uL


 


Baso # (Auto)     (0.0-0.1)  K/uL


 


Nucleated RBC %     /100WBC


 


Nucleated RBCs #     K/uL


 


Lactate     (0.20-2.00)  mmol/L


 


Sodium     (136-145)  mmol/L


 


Potassium     (3.5-5.1)  mmol/L


 


Chloride     ()  mmol/L


 


Carbon Dioxide     (21.0-32.0)  mmol/L


 


BUN     (7.0-18.0)  mg/dL


 


Creatinine     (0.6-1.0)  mg/dL


 


Est Cr Clr Drug Dosing     mL/min


 


Estimated GFR (MDRD)     ml/min


 


Glucose     ()  mg/dL


 


POC Glucose   165 H  184 H  ()  mg/dL


 


Calcium     (8.5-10.1)  mg/dL


 


Troponin I     (0.000-0.056)  ng/mL


 


B-Natriuretic Peptide  6    (<100)  PG/ML














  03/17/20 03/17/20 Range/Units





  08:52 08:52 


 


WBC  9.68   (4.0-11.0)  K/uL


 


RBC  5.11   (4.30-5.90)  M/uL


 


Hgb  14.1   (12.0-16.0)  g/dL


 


Hct  44.3   (36.0-46.0)  %


 


MCV  86.7   (80.0-98.0)  fL


 


MCH  27.6   (27.0-32.0)  pg


 


MCHC  31.8   (31.0-37.0)  g/dL


 


RDW Std Deviation  49.0   (28.0-62.0)  fl


 


RDW Coeff of Joseph  15   (11.0-15.0)  %


 


Plt Count  312   (150-400)  K/uL


 


MPV  10.60   (7.40-12.00)  fL


 


Neut % (Auto)  91.1 H   (48.0-80.0)  %


 


Lymph % (Auto)  8.3 L   (16.0-40.0)  %


 


Mono % (Auto)  0.6   (0.0-15.0)  %


 


Eos % (Auto)  0.0   (0.0-7.0)  %


 


Baso % (Auto)  0.0   (0.0-1.5)  %


 


Neut # (Auto)  8.8 H   (1.4-5.7)  K/uL


 


Lymph # (Auto)  0.8   (0.6-2.4)  K/uL


 


Mono # (Auto)  0.1   (0.0-0.8)  K/uL


 


Eos # (Auto)  0.0   (0.0-0.7)  K/uL


 


Baso # (Auto)  0.0   (0.0-0.1)  K/uL


 


Nucleated RBC %  0.0   /100WBC


 


Nucleated RBCs #  0   K/uL


 


Lactate    (0.20-2.00)  mmol/L


 


Sodium   142  (136-145)  mmol/L


 


Potassium   3.2 L  (3.5-5.1)  mmol/L


 


Chloride   103  ()  mmol/L


 


Carbon Dioxide   27.0  (21.0-32.0)  mmol/L


 


BUN   13  (7.0-18.0)  mg/dL


 


Creatinine   1.3 H  (0.6-1.0)  mg/dL


 


Est Cr Clr Drug Dosing   34.11  mL/min


 


Estimated GFR (MDRD)   39.7  ml/min


 


Glucose   206 H  ()  mg/dL


 


POC Glucose    ()  mg/dL


 


Calcium   9.6  (8.5-10.1)  mg/dL


 


Troponin I    (0.000-0.056)  ng/mL


 


B-Natriuretic Peptide    (<100)  PG/ML











Result Diagrams: 


 03/17/20 08:52





 03/17/20 08:52


Mirza Results Last 24 hrs: 


 Microbiology











 03/16/20 23:13 Influenza Type A Antigen Screen - Final





 Nasopharyngeal Swab    NEGATIVE INFLUENZA A VIRUS AG





    REFERENCE RANGE: NEGATIVE





 Influenza Type B Antigen Screen - Final





    NEGATIVE INFLUENZA B VIRUS AG





    REFERENCE RANGE: NEGATIVE


 


 03/16/20 23:45 Anaerobic Blood Culture - Final





 Blood - Venous - Lab Draw 














Sepsis Event Note





- Evaluation


Sepsis Screening Result: No Definite Risk





- Focused Exam


Vital Signs: 


 Vital Signs











  Temp Pulse Resp BP Pulse Ox


 


 03/17/20 08:42  97.9 F  100  20  148/66 H  97


 


 03/17/20 04:00  97.4 F  98  18  132/84  94 L


 


 03/17/20 02:23  97.4 F  104 H  18  144/75 H  95


 


 03/17/20 01:35  97.6 F  104 H  24 H  162/82 H  90 L


 


 03/17/20 01:24  97.1 F  104 H  20  104/83  92 L


 


 03/16/20 23:56   98  22 H  109/73  97


 


 03/16/20 23:04  97.7 F  94  18  103/73  94 L











Date Exam was Performed: 03/17/20


Time Exam was Performed: 09:22





- Problem List


(1) COPD exacerbation


SNOMED Code(s): 934185845


   ICD Code: J44.1 - CHRONIC OBSTRUCTIVE PULMONARY DISEASE W (ACUTE) 

EXACERBATION   Status: Acute   Current Visit: Yes   





(2) HTN (hypertension)


SNOMED Code(s): 08038900


   ICD Code: I10 - ESSENTIAL (PRIMARY) HYPERTENSION   Status: Chronic   Current 

Visit: Yes   





(3) Afib


SNOMED Code(s): 40174157


   ICD Code: I48.91 - UNSPECIFIED ATRIAL FIBRILLATION   Status: Chronic   

Current Visit: Yes   





(4) S/P ablation of atrial fibrillation


SNOMED Code(s): 229983160, 858720887, 955025613


   ICD Code: Z98.890 - OTHER SPECIFIED POSTPROCEDURAL STATES; Z86.79 - PERSONAL 

HISTORY OF OTHER DISEASES OF THE CIRCULATORY SYSTEM   Status: Chronic   Current 

Visit: Yes   





(5) CAD (coronary artery disease)


SNOMED Code(s): 56068127


   ICD Code: I25.10 - ATHSCL HEART DISEASE OF NATIVE CORONARY ARTERY W/O ANG 

PCTRS   Status: Chronic   Current Visit: Yes   





(6) CHF (congestive heart failure)


SNOMED Code(s): 41643945


   ICD Code: I50.9 - HEART FAILURE, UNSPECIFIED   Status: Chronic   Current 

Visit: Yes   





(7) KAVITHA on CPAP


SNOMED Code(s): 44293188


   ICD Code: G47.33 - OBSTRUCTIVE SLEEP APNEA (ADULT) (PEDIATRIC); Z99.89 - 

DEPENDENCE ON OTHER ENABLING MACHINES AND DEVICES   Status: Chronic   Current 

Visit: Yes   





(8) DM type 2 (diabetes mellitus, type 2)


SNOMED Code(s): 11534300


   ICD Code: E11.9 - TYPE 2 DIABETES MELLITUS WITHOUT COMPLICATIONS   Status: 

Chronic   Current Visit: Yes   


Qualifiers: 


   Diabetes mellitus long term insulin use: without long term use 





(9) Lymphedema of both lower extremities


SNOMED Code(s): 54606674803850530


   ICD Code: I89.0 - LYMPHEDEMA, NOT ELSEWHERE CLASSIFIED   Status: Chronic   

Current Visit: Yes   





(10) Anticoagulation adequate


SNOMED Code(s): 808821804, 727725214


   ICD Code: Z79.01 - LONG TERM (CURRENT) USE OF ANTICOAGULANTS   Status: 

Chronic   Current Visit: Yes   





(11) COPD (chronic obstructive pulmonary disease)


SNOMED Code(s): 26774246


   ICD Code: J44.9 - CHRONIC OBSTRUCTIVE PULMONARY DISEASE, UNSPECIFIED   Status

: Chronic   Current Visit: No   


Problem List Initiated/Reviewed/Updated: Yes


Orders Last 24hrs: 


 Active Orders 24 hr











 Category Date Time Status


 


 Admission Status [Patient Status] [ADT] Stat ADT  03/17/20 01:38 Active


 


 Blood Glucose Check, Bedside [RC] TIDAC Care  03/17/20 05:32 Active


 


 Height and Weight [RC] DAILY Care  03/17/20 08:40 Active


 


 Intake and Output Strict [RC] Q12H Care  03/17/20 08:40 Active


 


 Intake and Output [RC] QSHIFT Care  03/17/20 07:56 Inactive


 


 Oxygen Therapy [RC] PRN Care  03/17/20 07:56 Active


 


 RT Aerosol Therapy [RC] ASDIRECTED Care  03/16/20 23:18 Active


 


 RT Aerosol Therapy [RC] ASDIRECTED Care  03/17/20 03:19 Active


 


 VTE/DVT Education [RC] PER UNIT ROUTINE Care  03/17/20 07:56 Active


 


 Vital Signs [RC] Q4H Care  03/17/20 07:56 Active


 


 American Diabetic [employer] Diet [DIET] Diet  03/17/20 Breakfast Active


 


 CORONAVIRUS (COVID-19) PCR [MREF] Stat Lab  03/16/20 23:32 Ordered


 


 CULTURE BLOOD [BC] Stat Lab  03/16/20 23:40 Received


 


 CULTURE BLOOD [BC] Stat Lab  03/16/20 23:45 Results


 


 Acetaminophen [Tylenol] Med  03/17/20 06:07 Active





 650 mg PO Q6H PRN   


 


 Albuterol/Ipratropium [DuoNeb 3.0-0.5 MG/3 ML] Med  03/17/20 06:00 Active





 3 ml NEB Q6HRRT   


 


 Gabapentin [Neurontin] Med  03/17/20 09:00 Active





 100 mg PO DAILY   


 


 Gabapentin [Neurontin] Med  03/17/20 21:00 Active





 300 mg PO BEDTIME   


 


 Insulin Aspart [NovoLOG] Med  03/17/20 07:55 Active





 See Protocol  SUBCUT TIDAC   


 


 Ondansetron [Zofran] Med  03/17/20 07:55 Active





 4 mg IVPUSH Q4H PRN   


 


 Potassium Chloride [Klor-Con M20] Med  03/17/20 09:18 Once





 40 meq PO ONETIME ONE   


 


 Sodium Chloride 0.9% [Saline Flush] Med  03/17/20 07:55 Active





 2.5 ml FLUSH ASDIRECTED PRN   


 


 methylPREDNISolone Sod Succ [Solu-MEDROL] Med  03/17/20 09:00 Active





 125 mg IVPUSH DAILY   


 


 Blood Culture x2 Reflex Set [OM.PC] Stat Oth  03/16/20 23:16 Ordered


 


 Isolation [COMM] Routine Oth  03/16/20 21:21 Active


 


 Saline Lock Insert [OM.PC] Routine Oth  03/17/20 07:55 Ordered


 


 Resuscitation Status Routine Resus Stat  03/17/20 07:55 Ordered








 Medication Orders





Acetaminophen (Tylenol)  650 mg PO Q6H PRN


   PRN Reason: Pain (mild 1-3)


   Last Admin: 03/17/20 06:26  Dose: 650 mg


Albuterol/Ipratropium (Duoneb 3.0-0.5 Mg/3 Ml)  3 ml NEB Q6HRRT [facility]


   Last Admin: 03/17/20 06:07  Dose: 3 ml


Gabapentin (Neurontin)  300 mg PO BEDTIME ZULEYKA


Gabapentin (Neurontin)  100 mg PO DAILY [facility]


   Last Admin: 03/17/20 08:44  Dose: 100 mg


Insulin Aspart (Novolog)  0 unit SUBCUT TIDAC [facility]; Protocol


   Last Admin: 03/17/20 08:52  Dose: 1 unit


Methylprednisolone Sodium Succinate (Solu-Medrol)  125 mg IVPUSH DAILY [facility]


   Last Admin: 03/17/20 08:44  Dose: 125 mg


Ondansetron HCl (Zofran)  4 mg IVPUSH Q4H PRN


   PRN Reason: Nausea


Potassium Chloride (Klor-Con M20)  40 meq PO ONETIME ONE


   Stop: 03/17/20 09:19


Sodium Chloride (Saline Flush)  2.5 ml FLUSH ASDIRECTED PRN


   PRN Reason: Keep Vein Open








Assessment/Plan Comment:: 





This 79 year old female admitted with COPD exacerbation and URI





1. COPD exacerbation/URI


- Influenza negative, coronavirus testing pending


- Continue Solumedrol 125 mg IV daily for now, wheezing improved


- Duonebs every 6hr PRN


- Levaquin 750 q48 hrs


- Tylenol PRN pain


- Respiratory consult for COPD education


- Needs maintenance therapy, add Advair for now. 


- Leukocytosis improved overnight. Monitor. 





2. DM type 2:


- Hold oral medications


- Novolog SSI TIDAC


- Counseled on higher glucose due to steroids.





3. HTN: Stable. 


- Continue Amlodipine 5 daily





4. Afib, s/p ablation 2016


- Stable, continue Anticoagulation with Elquis





5. CHF


- Last ECHO 2018 revealed mild mitral regurgitation, ejection fraction 65% with 

no evidence of pulmonary hypertension or effusion


- Hold Lasix today due to bump in Cr, does not appear overloaded. Monitor


- Strict I/O


- Daily weights





6. Chronic pain:


- Continue Gabapentin











VTE prophylaxis: Eliquis





Code Status: Full code





Dispo: 1-2 days pending improvement.





























- Mortality Measure


Prognosis:: Good

## 2020-03-18 LAB
BUN SERPL-MCNC: 17 MG/DL (ref 7–18)
CHLORIDE SERPL-SCNC: 104 MMOL/L (ref 98–107)
CO2 SERPL-SCNC: 26.8 MMOL/L (ref 21–32)
GLUCOSE SERPL-MCNC: 165 MG/DL (ref 74–106)
POTASSIUM SERPL-SCNC: 3.9 MMOL/L (ref 3.5–5.1)
SODIUM SERPL-SCNC: 141 MMOL/L (ref 136–145)

## 2020-03-18 RX ADMIN — METHYLPREDNISOLONE SODIUM SUCCINATE SCH MG: 125 INJECTION, POWDER, FOR SOLUTION INTRAMUSCULAR; INTRAVENOUS at 09:01

## 2020-03-18 RX ADMIN — FLUTICASONE PROPIONATE AND SALMETEROL SCH INHALATION: 50; 250 POWDER RESPIRATORY (INHALATION) at 09:00

## 2020-03-18 NOTE — [id number]
**Discharge Summary





- Hospital Course


Brief History: This 79 barbara old female with pmh of COPD, HTN, Afib s/p ablation 

2016 on Eliquis, KAVITHA on CPAP, obesity, DM type 2, and chronic pain presented to 

the ED with complaints of cough, dyspnea and not feeling well. She reports she 

started having some difficultly breathing 2 days ago, has felt warm at home and 

sweating at night. She Denies over fevers, but some chillls. Reports non 

productive dry cough. No significant sinus congestion or pressure. No ear pain. 

No sore throat. She denies chest pain or palpitations. She denies abdominal pain

, reports diarrhea a few days ago with nausea, but none since then. She denies 

recent travel. Reports a boy was with her grand daughter last week, [place] She had no direct contact with him. She denies urinary concerns 

or neurological deficits. No tobacco or vaping, no recreational drug use or 

alcohol use.  In the ED leukocytosis 15,870 noted with elevated hgb 16.4 and 

hct 51.6. K+ 3.3, bicarb elevated 32.8. Troponin negative. CXR negative. She 

was given Duonebs, Solumedrol and Levaquin. Symptoms improved in the ED. 

Influenza was negative, corona virus pending. She will be admitted for COPD 

exacerbation related to URI.  She was seenin primary care clinic, with Dr Champagne 2/11 for similar symptoms. Was started on Trelegy for maintenance inhaler

, encouraged to continue albuterol.


Diagnosis: Stroke: No





- Discharge Data


Discharge Date: 03/18/20


Discharge Disposition: Home, Self-Care 01


Condition: Good





- Referral to Home Health


Primary Care Physician: 


Birgit Holden, DO








- Discharge Diagnosis/Problem(s)


(1) COPD exacerbation


SNOMED Code(s): 105340251


   ICD Code: J44.1 - CHRONIC OBSTRUCTIVE PULMONARY DISEASE W (ACUTE) 

EXACERBATION   Status: Acute   Current Visit: Yes   





(2) HTN (hypertension)


SNOMED Code(s): 82842636


   ICD Code: I10 - ESSENTIAL (PRIMARY) HYPERTENSION   Status: Chronic   Current 

Visit: Yes   





(3) Afib


SNOMED Code(s): 47021996


   ICD Code: I48.91 - UNSPECIFIED ATRIAL FIBRILLATION   Status: Chronic   

Current Visit: Yes   





(4) S/P ablation of atrial fibrillation


SNOMED Code(s): 102292978, 075580856, 649420640


   ICD Code: Z98.890 - OTHER SPECIFIED POSTPROCEDURAL STATES; Z86.79 - PERSONAL 

HISTORY OF OTHER DISEASES OF THE CIRCULATORY SYSTEM   Status: Chronic   Current 

Visit: Yes   





(5) CAD (coronary artery disease)


SNOMED Code(s): 07093638


   ICD Code: I25.10 - ATHSCL HEART DISEASE OF NATIVE CORONARY ARTERY W/O ANG 

PCTRS   Status: Chronic   Current Visit: Yes   





(6) CHF (congestive heart failure)


SNOMED Code(s): 27620538


   ICD Code: I50.9 - HEART FAILURE, UNSPECIFIED   Status: Chronic   Current 

Visit: Yes   





(7) KAVITHA on CPAP


SNOMED Code(s): 22896722


   ICD Code: G47.33 - OBSTRUCTIVE SLEEP APNEA (ADULT) (PEDIATRIC); Z99.89 - 

DEPENDENCE ON OTHER ENABLING MACHINES AND DEVICES   Status: Chronic   Current 

Visit: Yes   





(8) DM type 2 (diabetes mellitus, type 2)


SNOMED Code(s): 39725410


   ICD Code: E11.9 - TYPE 2 DIABETES MELLITUS WITHOUT COMPLICATIONS   Status: 

Chronic   Current Visit: Yes   


Qualifiers: 


   Diabetes mellitus long term insulin use: without long term use 





(9) Lymphedema of both lower extremities


SNOMED Code(s): 98332503996379066


   ICD Code: I89.0 - LYMPHEDEMA, NOT ELSEWHERE CLASSIFIED   Status: Chronic   

Current Visit: Yes   





(10) Anticoagulation adequate


SNOMED Code(s): 190203597, 136294058


   ICD Code: Z79.01 - LONG TERM (CURRENT) USE OF ANTICOAGULANTS   Status: 

Chronic   Current Visit: Yes   





(11) COPD (chronic obstructive pulmonary disease)


SNOMED Code(s): 16263365


   ICD Code: J44.9 - CHRONIC OBSTRUCTIVE PULMONARY DISEASE, UNSPECIFIED   Status

: Chronic   Current Visit: No   





- Patient Summary/Data


Consults: 


 Consultations





03/17/20 09:47


Consult to Respiratory Therapy [Respiratory Care Assess and Treatment] [CONS] 

Routine 





03/18/20 10:08


Consult to Physical Therapy [PT Evaluation and Treatment] [CONS] Routine 














- Patient Instructions


Diet: Heart Healthy Diet, Diabetic Diet


Activity: As Tolerated, No Strenuous Activities, Rest and Relax Today


Showering/Bathing: May Shower


Notify Provider of: Fever, Increased Pain, Swelling and Redness, Drainage, 

Nausea and/or Vomiting


Other/Special Instructions: Self isolate for now. Good hand washing. and if 

family members are sick stay away from them.  Pulmonary function testing as 

outpatient.





- Discharge Plan


*PRESCRIPTION DRUG MONITORING PROGRAM REVIEWED*: Not Applicable


*COPY OF PRESCRIPTION DRUG MONITORING REPORT IN PATIENT IRAM: Not Applicable


Prescriptions/Med Rec: 


Levofloxacin [Levaquin] 750 mg PO Q48H #3 tablet


predniSONE [Prednisone] 40 mg PO DAILY #8 tablet


Home Medications: 


 Home Meds





Acyclovir 400 mg PO DAILY 11/21/19 [History]


Apixaban [Eliquis] 5 mg PO BID 11/21/19 [History]


Furosemide [Lasix] 80 mg PO BID 11/21/19 [History]


Gabapentin [Neurontin] 100 mg PO QAM 11/21/19 [History]


Gabapentin [Neurontin] 300 mg PO BEDTIME 11/21/19 [History]


allopurinoL [Zyloprim] 100 mg PO DAILY 11/21/19 [History]


Albuterol [Ventolin HFA] 1 puff INH Q4H PRN #1 puff 11/22/19 [Rx]


Clotrimazole/Betamethasone Dip [Lotrisone Cream] 1 applic TP BID PRN 03/17/20 [

History]


Cyclobenzaprine [Flexeril] 10 mg PO TID PRN 03/17/20 [History]


Dulaglutide [Trulicity] 1.5 mg SQ WEEKLY 03/17/20 [History]


Fluticasone/Umeclidin/Vilanter [Trelegy Ellipta 100-62.5-25 MCG] 1 puff INH 

DAILY 03/17/20 [History]


Hydrocodone/Acetaminophen [Hydrocodon-Acetaminophn ] 1 tab PO Q4H PRN 03/ 17/20 [History]


Acetaminophen [Tylenol] 650 mg PO Q6H PRN  tablet 03/18/20 [Rx]


Levofloxacin [Levaquin] 750 mg PO Q48H #3 tablet 03/18/20 [Rx]


predniSONE [Prednisone] 40 mg PO DAILY #8 tablet 03/18/20 [Rx]








Oxygen Therapy Mode: Room Air


Patient Handouts:  Chronic Obstructive Pulmonary Disease, Easy-to-Read, 

Levofloxacin tablets, Prednisone tablets


Referrals: 


Birgit Holden DO [Primary Care Provider] - 


Moise Vila MD [Ordering Only Provider] - 03/24/20 9:00 am (Arrive at 9 with 

photo ID insurance cards, and medication list. This was not able to made with 

Adina, due to her full schedule. )





- Discharge Summary/Plan Comment


DC Time >30 min.: No


Discharge Summary/Plan Comment: 





Admitting Diagnoses:





COPD exacerbation


URI








Discharge Diagnoses:





COPD exacerbation


URI


bronchitis











Ashanti was admitted and monitored for bronchitis and COPD exacerbation. She was 

treated with Levaquin and Solumedrol. She did not need oxygen during stay. She 

reports she is feeling improved today. COVID testing negative. She will be 

discharged home today with Levaquin to take Q48 hr x 3 more days and short 

prednisone taper. She is to monitor BS, which may elevate slightly with 

steroids. She is to continue Trelegy, recommend outpatient PFT testing as well. 

She was encourage to self isolate due to acute illness and to limit interaction 

with grand children currently while she is not feeling well. She is to return 

to the ED or clinic if concern should arise. Follow up with PCP in 1 week. 





- Patient Data


Vitals - Most Recent: 


 Last Vital Signs











Temp  98.3 F   03/18/20 11:51


 


Pulse  89   03/18/20 11:51


 


Resp  17   03/18/20 11:51


 


BP  127/65   03/18/20 11:51


 


Pulse Ox  94 L  03/18/20 11:51











Weight - Most Recent: 116.12 kg


I&O - Last 24 hours: 


 Intake & Output











 03/17/20 03/18/20 03/18/20





 22:59 06:59 14:59


 


Intake Total 800 1100 


 


Output Total 350 500 


 


Balance 450 600 











Lab Results - Last 24 hrs: 


 Laboratory Results - last 24 hr











  03/17/20 03/18/20 03/18/20 Range/Units





  17:52 07:57 08:28 


 


WBC    16.98 H  (4.0-11.0)  K/uL


 


RBC    4.81  (4.30-5.90)  M/uL


 


Hgb    13.0  (12.0-16.0)  g/dL


 


Hct    41.4  (36.0-46.0)  %


 


MCV    86.1  (80.0-98.0)  fL


 


MCH    27.0  (27.0-32.0)  pg


 


MCHC    31.4  (31.0-37.0)  g/dL


 


RDW Std Deviation    50.0  (28.0-62.0)  fl


 


RDW Coeff of Joseph    16 H  (11.0-15.0)  %


 


Plt Count    317  (150-400)  K/uL


 


MPV    10.40  (7.40-12.00)  fL


 


Neut % (Auto)    87.1 H  (48.0-80.0)  %


 


Lymph % (Auto)    7.4 L  (16.0-40.0)  %


 


Mono % (Auto)    5.5  (0.0-15.0)  %


 


Eos % (Auto)    0.0  (0.0-7.0)  %


 


Baso % (Auto)    0.0  (0.0-1.5)  %


 


Neut # (Auto)    14.8 H  (1.4-5.7)  K/uL


 


Lymph # (Auto)    1.3  (0.6-2.4)  K/uL


 


Mono # (Auto)    0.9 H  (0.0-0.8)  K/uL


 


Eos # (Auto)    0.0  (0.0-0.7)  K/uL


 


Baso # (Auto)    0.0  (0.0-0.1)  K/uL


 


Nucleated RBC %    0.0  /100WBC


 


Nucleated RBCs #    0  K/uL


 


Sodium     (136-145)  mmol/L


 


Potassium     (3.5-5.1)  mmol/L


 


Chloride     ()  mmol/L


 


Carbon Dioxide     (21.0-32.0)  mmol/L


 


BUN     (7.0-18.0)  mg/dL


 


Creatinine     (0.6-1.0)  mg/dL


 


Est Cr Clr Drug Dosing     mL/min


 


Estimated GFR (MDRD)     ml/min


 


Glucose     ()  mg/dL


 


POC Glucose  206 H  163 H   ()  mg/dL


 


Calcium     (8.5-10.1)  mg/dL














  03/18/20 03/18/20 Range/Units





  08:28 11:16 


 


WBC    (4.0-11.0)  K/uL


 


RBC    (4.30-5.90)  M/uL


 


Hgb    (12.0-16.0)  g/dL


 


Hct    (36.0-46.0)  %


 


MCV    (80.0-98.0)  fL


 


MCH    (27.0-32.0)  pg


 


MCHC    (31.0-37.0)  g/dL


 


RDW Std Deviation    (28.0-62.0)  fl


 


RDW Coeff of Joseph    (11.0-15.0)  %


 


Plt Count    (150-400)  K/uL


 


MPV    (7.40-12.00)  fL


 


Neut % (Auto)    (48.0-80.0)  %


 


Lymph % (Auto)    (16.0-40.0)  %


 


Mono % (Auto)    (0.0-15.0)  %


 


Eos % (Auto)    (0.0-7.0)  %


 


Baso % (Auto)    (0.0-1.5)  %


 


Neut # (Auto)    (1.4-5.7)  K/uL


 


Lymph # (Auto)    (0.6-2.4)  K/uL


 


Mono # (Auto)    (0.0-0.8)  K/uL


 


Eos # (Auto)    (0.0-0.7)  K/uL


 


Baso # (Auto)    (0.0-0.1)  K/uL


 


Nucleated RBC %    /100WBC


 


Nucleated RBCs #    K/uL


 


Sodium  141   (136-145)  mmol/L


 


Potassium  3.9   (3.5-5.1)  mmol/L


 


Chloride  104   ()  mmol/L


 


Carbon Dioxide  26.8   (21.0-32.0)  mmol/L


 


BUN  17   (7.0-18.0)  mg/dL


 


Creatinine  1.0   (0.6-1.0)  mg/dL


 


Est Cr Clr Drug Dosing  44.35   mL/min


 


Estimated GFR (MDRD)  53.8   ml/min


 


Glucose  165 H   ()  mg/dL


 


POC Glucose   164 H  ()  mg/dL


 


Calcium  9.7   (8.5-10.1)  mg/dL











LUCIA Results - Last 24 hrs: 


 Microbiology











 03/16/20 23:45 Aerobic Blood Culture - Preliminary





 Blood - Venous - Lab Draw    NO GROWTH AFTER 1 DAY





 Anaerobic Blood Culture - Final


 


 03/16/20 23:40 Aerobic Blood Culture - Preliminary





 Blood - Venous    NO GROWTH AFTER 1 DAY





 Anaerobic Blood Culture - Preliminary





    NO GROWTH AFTER 1 DAY











Med Orders - Current: 


 Current Medications





Acetaminophen (Tylenol)  650 mg PO Q6H PRN


   PRN Reason: Pain (mild 1-3)


   Last Admin: 03/18/20 11:58 Dose:  650 mg


Albuterol/Ipratropium (Duoneb 3.0-0.5 Mg/3 Ml)  3 ml NEB Q6HRRT [facility]


   Last Admin: 03/18/20 11:14 Dose:  3 ml


Amlodipine Besylate (Norvasc)  5 mg PO DAILY [facility]


   Last Admin: 03/18/20 09:00 Dose:  5 mg


Apixaban (Eliquis)  5 mg PO BID [facility]


   Last Admin: 03/18/20 09:00 Dose:  5 mg


Gabapentin (Neurontin)  300 mg PO BEDTIME [facility]


   Last Admin: 03/17/20 20:46 Dose:  300 mg


Gabapentin (Neurontin)  100 mg PO DAILY [facility]


   Last Admin: 03/18/20 09:00 Dose:  100 mg


Levofloxacin/Dextrose 750 mg/ (Premix)  150 mls @ 100 mls/hr IV Q48H [facility]


Insulin Aspart (Novolog)  0 unit SUBCUT TIDAC [facility]; Protocol


   Last Admin: 03/18/20 11:49 Dose:  1 unit


Methylprednisolone Sodium Succinate (Solu-Medrol)  125 mg IVPUSH DAILY [facility]


   Last Admin: 03/18/20 09:01 Dose:  125 mg


Ondansetron HCl (Zofran)  4 mg IVPUSH Q4H PRN


   PRN Reason: Nausea


Fluticasone/Salmeterol (Advair Diskus 250-50)  1 puff INH BID [facility]


   Last Admin: 03/18/20 09:00 Dose:  1 inhalation


Sodium Chloride (Saline Flush)  2.5 ml FLUSH ASDIRECTED PRN


   PRN Reason: Keep Vein Open





Discontinued Medications





Albuterol/Ipratropium (Duoneb 3.0-0.5 Mg/3 Ml)  9 ml NEB ONETIME ONE


   Stop: 03/16/20 23:19


   Last Admin: 03/16/20 23:45 Dose:  9 ml


Levofloxacin/Dextrose 750 mg/ (Premix)  150 mls @ 100 mls/hr IV ONETIME ONE


   Stop: 03/17/20 00:46


   Last Admin: 03/16/20 23:48 Dose:  100 mls/hr


Methylprednisolone Sodium Succinate (Solu-Medrol)  125 mg IVPUSH ONETIME ONE


   Stop: 03/16/20 23:19


   Last Admin: 03/16/20 23:47 Dose:  125 mg


Potassium Chloride (Klor-Con M20)  20 meq PO ONETIME ONE


   Stop: 03/17/20 00:29


   Last Admin: 03/17/20 01:35 Dose:  20 meq


Potassium Chloride (Klor-Con M20)  40 meq PO ONETIME ONE


   Stop: 03/17/20 09:19


   Last Admin: 03/17/20 09:38 Dose:  40 meq